# Patient Record
Sex: FEMALE | Race: BLACK OR AFRICAN AMERICAN | NOT HISPANIC OR LATINO | Employment: FULL TIME | URBAN - METROPOLITAN AREA
[De-identification: names, ages, dates, MRNs, and addresses within clinical notes are randomized per-mention and may not be internally consistent; named-entity substitution may affect disease eponyms.]

---

## 2017-03-08 ENCOUNTER — HOSPITAL ENCOUNTER (OUTPATIENT)
Dept: RADIOLOGY | Facility: HOSPITAL | Age: 47
Discharge: HOME | End: 2017-03-08
Attending: NURSE PRACTITIONER
Payer: MEDICAID

## 2017-03-08 DIAGNOSIS — M25.561 RIGHT KNEE PAIN: ICD-10-CM

## 2017-03-08 PROCEDURE — 73564 X-RAY EXAM KNEE 4 OR MORE: CPT | Mod: TC,PO,RT

## 2017-03-10 DIAGNOSIS — E11.9 TYPE 2 DIABETES MELLITUS WITHOUT COMPLICATION: ICD-10-CM

## 2017-04-23 RX ORDER — HYDROCHLOROTHIAZIDE 25 MG/1
TABLET ORAL
Qty: 90 TABLET | Refills: 1 | Status: SHIPPED | OUTPATIENT
Start: 2017-04-23 | End: 2020-10-12 | Stop reason: SDUPTHER

## 2017-05-03 RX ORDER — LISINOPRIL 20 MG/1
TABLET ORAL
Qty: 90 TABLET | Refills: 3 | Status: SHIPPED | OUTPATIENT
Start: 2017-05-03 | End: 2020-07-22 | Stop reason: SDUPTHER

## 2017-06-01 ENCOUNTER — PATIENT OUTREACH (OUTPATIENT)
Dept: ADMINISTRATIVE | Facility: HOSPITAL | Age: 47
End: 2017-06-01

## 2017-06-01 NOTE — PROGRESS NOTES
Phoned patient to confirm PCP and schedule office visit. Unable to reach, both numbers rang a few times then gave a busy signal, no voicemail.

## 2017-07-14 DIAGNOSIS — Z12.31 SCREENING MAMMOGRAM, ENCOUNTER FOR: Primary | ICD-10-CM

## 2017-07-27 ENCOUNTER — HOSPITAL ENCOUNTER (OUTPATIENT)
Dept: RADIOLOGY | Facility: HOSPITAL | Age: 47
Discharge: HOME | End: 2017-07-27
Attending: OBSTETRICS & GYNECOLOGY
Payer: MEDICAID

## 2017-07-27 VITALS — BODY MASS INDEX: 43.49 KG/M2 | HEIGHT: 65 IN | WEIGHT: 261 LBS

## 2017-07-27 DIAGNOSIS — Z12.31 SCREENING MAMMOGRAM, ENCOUNTER FOR: ICD-10-CM

## 2017-07-27 PROCEDURE — 77067 SCR MAMMO BI INCL CAD: CPT | Mod: TC

## 2017-09-25 ENCOUNTER — LAB VISIT (OUTPATIENT)
Dept: LAB | Facility: HOSPITAL | Age: 47
End: 2017-09-25
Attending: FAMILY MEDICINE
Payer: MEDICAID

## 2017-09-25 DIAGNOSIS — E78.2 MIXED HYPERLIPIDEMIA: ICD-10-CM

## 2017-09-25 DIAGNOSIS — E11.9 TYPE 2 DIABETES MELLITUS WITHOUT COMPLICATION: ICD-10-CM

## 2017-09-25 DIAGNOSIS — E55.9 UNSPECIFIED VITAMIN D DEFICIENCY: Primary | ICD-10-CM

## 2017-09-25 LAB
25(OH)D3+25(OH)D2 SERPL-MCNC: 38 NG/ML
ANION GAP SERPL CALC-SCNC: 13 MMOL/L
BUN SERPL-MCNC: 11 MG/DL
CALCIUM SERPL-MCNC: 9.8 MG/DL
CHLORIDE SERPL-SCNC: 101 MMOL/L
CHOLEST SERPL-MCNC: 223 MG/DL
CHOLEST/HDLC SERPL: 3.4 {RATIO}
CO2 SERPL-SCNC: 26 MMOL/L
CREAT SERPL-MCNC: 0.69 MG/DL
EST. GFR  (AFRICAN AMERICAN): >60 ML/MIN/1.73 M^2
EST. GFR  (NON AFRICAN AMERICAN): >60 ML/MIN/1.73 M^2
ESTIMATED AVG GLUCOSE: 123 MG/DL
ESTIMATED AVG GLUCOSE: 123 MG/DL
GLUCOSE SERPL-MCNC: 107 MG/DL
HBA1C MFR BLD HPLC: 5.9 %
HBA1C MFR BLD HPLC: 5.9 %
HDLC SERPL-MCNC: 65 MG/DL
HDLC SERPL: 29.1 %
LDLC SERPL CALC-MCNC: 139.2 MG/DL
NONHDLC SERPL-MCNC: 158 MG/DL
POTASSIUM SERPL-SCNC: 4 MMOL/L
SODIUM SERPL-SCNC: 140 MMOL/L
TRIGL SERPL-MCNC: 94 MG/DL

## 2017-09-25 PROCEDURE — 83036 HEMOGLOBIN GLYCOSYLATED A1C: CPT | Mod: PO

## 2017-09-25 PROCEDURE — 80061 LIPID PANEL: CPT

## 2017-09-25 PROCEDURE — 36415 COLL VENOUS BLD VENIPUNCTURE: CPT | Mod: PO

## 2017-09-25 PROCEDURE — 82306 VITAMIN D 25 HYDROXY: CPT | Mod: PO

## 2017-09-25 PROCEDURE — 80048 BASIC METABOLIC PNL TOTAL CA: CPT | Mod: PO

## 2018-02-28 ENCOUNTER — CLINICAL SUPPORT (OUTPATIENT)
Dept: FAMILY MEDICINE | Facility: CLINIC | Age: 48
End: 2018-02-28
Payer: COMMERCIAL

## 2018-02-28 DIAGNOSIS — Z02.4 ENCOUNTER FOR CDL (COMMERCIAL DRIVING LICENSE) EXAM: Primary | ICD-10-CM

## 2018-02-28 PROCEDURE — 81002 URINALYSIS NONAUTO W/O SCOPE: CPT | Mod: S$GLB,,, | Performed by: FAMILY MEDICINE

## 2018-02-28 PROCEDURE — 99385 PREV VISIT NEW AGE 18-39: CPT | Mod: S$GLB,,, | Performed by: FAMILY MEDICINE

## 2018-03-05 LAB
BILIRUB SERPL-MCNC: ABNORMAL MG/DL
BLOOD URINE, POC: 250
COLOR, POC UA: YELLOW
GLUCOSE UR QL STRIP: NORMAL
KETONES UR QL STRIP: ABNORMAL
LEUKOCYTE ESTERASE URINE, POC: ABNORMAL
NITRITE, POC UA: ABNORMAL
PH, POC UA: ABNORMAL
PROTEIN, POC: NORMAL
SPECIFIC GRAVITY, POC UA: 1.01
UROBILINOGEN, POC UA: NORMAL

## 2018-04-18 ENCOUNTER — LAB VISIT (OUTPATIENT)
Dept: LAB | Facility: HOSPITAL | Age: 48
End: 2018-04-18
Attending: NURSE PRACTITIONER
Payer: COMMERCIAL

## 2018-04-18 DIAGNOSIS — E55.9 AVITAMINOSIS D: Primary | ICD-10-CM

## 2018-04-18 LAB
BASOPHILS # BLD AUTO: 0.02 K/UL
BASOPHILS NFR BLD: 0.2 %
CHOLEST SERPL-MCNC: 220 MG/DL
CHOLEST/HDLC SERPL: 3.6 {RATIO}
DIFFERENTIAL METHOD: NORMAL
EOSINOPHIL # BLD AUTO: 0.5 K/UL
EOSINOPHIL NFR BLD: 5 %
ERYTHROCYTE [DISTWIDTH] IN BLOOD BY AUTOMATED COUNT: 13.6 %
ESTIMATED AVG GLUCOSE: 128 MG/DL
HBA1C MFR BLD HPLC: 6.1 %
HCT VFR BLD AUTO: 42 %
HDLC SERPL-MCNC: 61 MG/DL
HDLC SERPL: 27.7 %
HGB BLD-MCNC: 13.5 G/DL
LDLC SERPL CALC-MCNC: 138.6 MG/DL
LYMPHOCYTES # BLD AUTO: 2.5 K/UL
LYMPHOCYTES NFR BLD: 28.4 %
MCH RBC QN AUTO: 30.8 PG
MCHC RBC AUTO-ENTMCNC: 32.1 G/DL
MCV RBC AUTO: 96 FL
MONOCYTES # BLD AUTO: 0.8 K/UL
MONOCYTES NFR BLD: 8.9 %
NEUTROPHILS # BLD AUTO: 5.1 K/UL
NEUTROPHILS NFR BLD: 57.3 %
NONHDLC SERPL-MCNC: 159 MG/DL
PLATELET # BLD AUTO: 301 K/UL
PMV BLD AUTO: 10.8 FL
RBC # BLD AUTO: 4.38 M/UL
TRIGL SERPL-MCNC: 102 MG/DL
TSH SERPL DL<=0.005 MIU/L-ACNC: 1.31 UIU/ML
WBC # BLD AUTO: 8.92 K/UL

## 2018-04-18 PROCEDURE — 80061 LIPID PANEL: CPT

## 2018-04-18 PROCEDURE — 85025 COMPLETE CBC W/AUTO DIFF WBC: CPT | Mod: PO

## 2018-04-18 PROCEDURE — 84443 ASSAY THYROID STIM HORMONE: CPT | Mod: PO

## 2018-04-18 PROCEDURE — 83036 HEMOGLOBIN GLYCOSYLATED A1C: CPT

## 2018-04-18 PROCEDURE — 36415 COLL VENOUS BLD VENIPUNCTURE: CPT | Mod: PO

## 2019-02-11 DIAGNOSIS — Z12.39 SCREENING BREAST EXAMINATION: Primary | ICD-10-CM

## 2019-02-12 ENCOUNTER — CLINICAL SUPPORT (OUTPATIENT)
Dept: FAMILY MEDICINE | Facility: CLINIC | Age: 49
End: 2019-02-12
Payer: COMMERCIAL

## 2019-02-12 PROCEDURE — 99201 PR OFFICE/OUTPT VISIT,NEW,LEVL I: CPT | Mod: S$GLB,,, | Performed by: FAMILY MEDICINE

## 2019-02-12 PROCEDURE — 99201 PR OFFICE/OUTPT VISIT,NEW,LEVL I: ICD-10-PCS | Mod: S$GLB,,, | Performed by: FAMILY MEDICINE

## 2019-02-12 PROCEDURE — 81002 URINALYSIS NONAUTO W/O SCOPE: CPT | Mod: S$GLB,,, | Performed by: FAMILY MEDICINE

## 2019-02-12 PROCEDURE — 81002 PR URINALYSIS NONAUTO W/O SCOPE: ICD-10-PCS | Mod: S$GLB,,, | Performed by: FAMILY MEDICINE

## 2019-02-12 RX ORDER — AMITRIPTYLINE HYDROCHLORIDE 10 MG/1
TABLET, FILM COATED ORAL
Qty: 60 TABLET | Refills: 1 | Status: SHIPPED | OUTPATIENT
Start: 2019-02-12 | End: 2020-08-12 | Stop reason: SDUPTHER

## 2019-02-15 ENCOUNTER — HOSPITAL ENCOUNTER (OUTPATIENT)
Dept: RADIOLOGY | Facility: HOSPITAL | Age: 49
Discharge: HOME | End: 2019-02-15
Attending: OBSTETRICS & GYNECOLOGY
Payer: COMMERCIAL

## 2019-02-15 DIAGNOSIS — Z12.39 SCREENING BREAST EXAMINATION: ICD-10-CM

## 2019-02-15 PROCEDURE — 77067 SCR MAMMO BI INCL CAD: CPT | Mod: TC,PO

## 2019-11-05 ENCOUNTER — OCCUPATIONAL HEALTH (OUTPATIENT)
Dept: URGENT CARE | Facility: CLINIC | Age: 49
End: 2019-11-05

## 2019-11-05 DIAGNOSIS — Z02.1 ENCOUNTER FOR PRE-EMPLOYMENT HEALTH SCREENING EXAMINATION: Primary | ICD-10-CM

## 2019-11-05 DIAGNOSIS — Z02.83 ENCOUNTER FOR DRUG SCREENING: Primary | ICD-10-CM

## 2019-11-05 PROCEDURE — 99499 PHYSICAL, BASIC COMPLEXITY: ICD-10-PCS | Mod: S$GLB,,, | Performed by: PREVENTIVE MEDICINE

## 2019-11-05 PROCEDURE — 80305 DRUG TEST PRSMV DIR OPT OBS: CPT | Mod: S$GLB,,, | Performed by: PREVENTIVE MEDICINE

## 2019-11-05 PROCEDURE — 99499 UNLISTED E&M SERVICE: CPT | Mod: S$GLB,,, | Performed by: PREVENTIVE MEDICINE

## 2019-11-05 PROCEDURE — 99199 UNLISTED SPECIAL SVC PX/RPRT: CPT | Mod: S$GLB,,, | Performed by: PREVENTIVE MEDICINE

## 2019-11-05 PROCEDURE — 99199 OCC MED MRO FEE: ICD-10-PCS | Mod: S$GLB,,, | Performed by: PREVENTIVE MEDICINE

## 2019-11-05 PROCEDURE — 80305 OOH COLLECTION ONLY DRUG SCREEN: ICD-10-PCS | Mod: S$GLB,,, | Performed by: PREVENTIVE MEDICINE

## 2020-01-20 ENCOUNTER — CLINICAL SUPPORT (OUTPATIENT)
Dept: FAMILY MEDICINE | Facility: CLINIC | Age: 50
End: 2020-01-20

## 2020-01-20 PROCEDURE — 81002 URINALYSIS NONAUTO W/O SCOPE: CPT | Mod: S$GLB,,, | Performed by: FAMILY MEDICINE

## 2020-01-20 PROCEDURE — 81002 PR URINALYSIS NONAUTO W/O SCOPE: ICD-10-PCS | Mod: S$GLB,,, | Performed by: FAMILY MEDICINE

## 2020-01-20 PROCEDURE — 99201 PR OFFICE/OUTPT VISIT,NEW,LEVL I: CPT | Mod: S$GLB,,, | Performed by: FAMILY MEDICINE

## 2020-01-20 PROCEDURE — 99201 PR OFFICE/OUTPT VISIT,NEW,LEVL I: ICD-10-PCS | Mod: S$GLB,,, | Performed by: FAMILY MEDICINE

## 2020-07-22 ENCOUNTER — TELEPHONE (OUTPATIENT)
Dept: FAMILY MEDICINE | Facility: CLINIC | Age: 50
End: 2020-07-22

## 2020-07-22 RX ORDER — METOPROLOL TARTRATE 100 MG/1
100 TABLET ORAL DAILY
Qty: 30 TABLET | Refills: 0 | Status: SHIPPED | OUTPATIENT
Start: 2020-07-22 | End: 2020-08-12 | Stop reason: SDUPTHER

## 2020-07-22 RX ORDER — LISINOPRIL 20 MG/1
20 TABLET ORAL DAILY
Qty: 30 TABLET | Refills: 0 | Status: SHIPPED | OUTPATIENT
Start: 2020-07-22 | End: 2020-08-12 | Stop reason: SDUPTHER

## 2020-07-22 RX ORDER — METFORMIN HYDROCHLORIDE 500 MG/1
500 TABLET ORAL 2 TIMES DAILY WITH MEALS
Qty: 60 TABLET | Refills: 0 | Status: SHIPPED | OUTPATIENT
Start: 2020-07-22 | End: 2020-08-12 | Stop reason: SDUPTHER

## 2020-07-22 NOTE — TELEPHONE ENCOUNTER
LM advising patient to contact office. 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  hasn't prescribed any medication for patient since 2016.     ----- Message from Ole Thorne sent at 7/22/2020 10:01 AM CDT -----  Regarding: Patient Advice  Contact: Amanda Colbert  Pt would like to know if she could have an appt made for next week for a check up Pt would like a call regarding her prescriptions     Pt can be reached at [phone]

## 2020-07-22 NOTE — TELEPHONE ENCOUNTER
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

## 2020-07-22 NOTE — TELEPHONE ENCOUNTER
----- Message from Elizabeth Medina sent at 7/22/2020 10:26 AM CDT -----  Regarding: Call  Pt is returning your call and can be reached at [phone].    Thank you.

## 2020-08-05 ENCOUNTER — PATIENT OUTREACH (OUTPATIENT)
Dept: ADMINISTRATIVE | Facility: HOSPITAL | Age: 50
End: 2020-08-05

## 2020-08-12 ENCOUNTER — PATIENT OUTREACH (OUTPATIENT)
Dept: ADMINISTRATIVE | Facility: HOSPITAL | Age: 50
End: 2020-08-12

## 2020-08-12 ENCOUNTER — OFFICE VISIT (OUTPATIENT)
Dept: FAMILY MEDICINE | Facility: CLINIC | Age: 50
End: 2020-08-12
Payer: COMMERCIAL

## 2020-08-12 VITALS
HEART RATE: 103 BPM | SYSTOLIC BLOOD PRESSURE: 116 MMHG | TEMPERATURE: 98 F | OXYGEN SATURATION: 95 % | DIASTOLIC BLOOD PRESSURE: 74 MMHG | HEIGHT: 65 IN | WEIGHT: 261.25 LBS | BODY MASS INDEX: 43.53 KG/M2

## 2020-08-12 DIAGNOSIS — Z00.00 ROUTINE HEALTH MAINTENANCE: Primary | ICD-10-CM

## 2020-08-12 DIAGNOSIS — R73.9 HYPERGLYCEMIA: ICD-10-CM

## 2020-08-12 DIAGNOSIS — Z23 NEED FOR TETANUS BOOSTER: ICD-10-CM

## 2020-08-12 DIAGNOSIS — Z01.419 ENCOUNTER FOR ANNUAL ROUTINE GYNECOLOGICAL EXAMINATION: ICD-10-CM

## 2020-08-12 PROCEDURE — 99396 PR PREVENTIVE VISIT,EST,40-64: ICD-10-PCS | Mod: S$GLB,,, | Performed by: FAMILY MEDICINE

## 2020-08-12 PROCEDURE — 3074F PR MOST RECENT SYSTOLIC BLOOD PRESSURE < 130 MM HG: ICD-10-PCS | Mod: CPTII,S$GLB,, | Performed by: FAMILY MEDICINE

## 2020-08-12 PROCEDURE — 99396 PREV VISIT EST AGE 40-64: CPT | Mod: S$GLB,,, | Performed by: FAMILY MEDICINE

## 2020-08-12 PROCEDURE — 3078F PR MOST RECENT DIASTOLIC BLOOD PRESSURE < 80 MM HG: ICD-10-PCS | Mod: CPTII,S$GLB,, | Performed by: FAMILY MEDICINE

## 2020-08-12 PROCEDURE — 3078F DIAST BP <80 MM HG: CPT | Mod: CPTII,S$GLB,, | Performed by: FAMILY MEDICINE

## 2020-08-12 PROCEDURE — 3008F PR BODY MASS INDEX (BMI) DOCUMENTED: ICD-10-PCS | Mod: CPTII,S$GLB,, | Performed by: FAMILY MEDICINE

## 2020-08-12 PROCEDURE — 3008F BODY MASS INDEX DOCD: CPT | Mod: CPTII,S$GLB,, | Performed by: FAMILY MEDICINE

## 2020-08-12 PROCEDURE — 3074F SYST BP LT 130 MM HG: CPT | Mod: CPTII,S$GLB,, | Performed by: FAMILY MEDICINE

## 2020-08-12 RX ORDER — MELOXICAM 7.5 MG/1
7.5 TABLET ORAL 2 TIMES DAILY
Qty: 180 TABLET | Refills: 1 | Status: SHIPPED | OUTPATIENT
Start: 2020-08-12 | End: 2021-08-23 | Stop reason: SDUPTHER

## 2020-08-12 RX ORDER — MELOXICAM 7.5 MG/1
7.5 TABLET ORAL 2 TIMES DAILY
COMMUNITY
End: 2020-08-12 | Stop reason: SDUPTHER

## 2020-08-12 RX ORDER — METOPROLOL TARTRATE 100 MG/1
100 TABLET ORAL DAILY
Qty: 90 TABLET | Refills: 3 | Status: SHIPPED | OUTPATIENT
Start: 2020-08-12 | End: 2021-08-23 | Stop reason: SDUPTHER

## 2020-08-12 RX ORDER — LISINOPRIL 20 MG/1
20 TABLET ORAL DAILY
Qty: 90 TABLET | Refills: 3 | Status: SHIPPED | OUTPATIENT
Start: 2020-08-12 | End: 2021-09-17 | Stop reason: SDUPTHER

## 2020-08-12 RX ORDER — METFORMIN HYDROCHLORIDE 500 MG/1
500 TABLET ORAL 2 TIMES DAILY WITH MEALS
Qty: 180 TABLET | Refills: 3 | Status: SHIPPED | OUTPATIENT
Start: 2020-08-12 | End: 2021-08-23 | Stop reason: SDUPTHER

## 2020-08-12 RX ORDER — AMITRIPTYLINE HYDROCHLORIDE 10 MG/1
TABLET, FILM COATED ORAL
Qty: 180 TABLET | Refills: 1 | Status: SHIPPED | OUTPATIENT
Start: 2020-08-12 | End: 2021-08-23

## 2020-08-12 NOTE — LETTER
August 12, 2020      [address]  [place] [address]  Phone: [phone]  Fax: [phone]       Patient: Amanda Colbert   YOB: 1970  Date of Visit: 08/12/2020    To Whom It May Concern:    Sameer Colbert  was at [facility] on 08/12/2020. She may return to work on 08/13/2020 with no restrictions. If you have any questions or concerns, or if I can be of further assistance, please do not hesitate to contact me.    Sincerely,    Roge Gayle MD

## 2020-08-12 NOTE — PROGRESS NOTES
" Patient ID: Amanda Colbert is a 49 y.o. female.    Chief Complaint: Check up; Med refill    HPI      Amanda Colbert is a 49 y.o. female here for follow-up visit-annual visit.  Patient with hypertension uses Lopressor 100 milligrams daily-lisinopril and hydrochlorothiazide  Patient with glucose intolerance uses metformin daily  Insomnia-Elavil 10 milligrams    Vitals:    08/12/20 1508   BP: 116/74   Pulse: 103   Temp: 97.7 °F (36.5 °C)   SpO2: 95%   Weight: 118.5 kg (261 lb 3.9 oz)   Height: 5' 5" (1.651 m)            Review of Symptoms    Constitutional  Neg activity change, No chills /fever   Resp  Neg hemoptysis, stridor, choking  CVS  Neg chest pain, palpitations    Physical Exam    Constitutional:  Oriented to person, place, and time.appears well-developed and well-nourished.  No distress.      HENT  Head: Normocephalic and atraumatic  Right Ear: External ear normal.   Left Ear: External ear normal.   Nose: External nose normal.   Mouth:  Moist mucus membranes.    Eyes:  Conjunctivae are normal. Right eye exhibits no discharge.  Left eye exhibits no discharge. No scleral icterus.  No periorbital edema    Cardiovascular:  Regular rate and rhythm with normal S1 and S2     Pulmonary/Chest:   Clear to auscultation bilaterally without wheezes, rhonchi or rales      Musculoskeletal:  No edema. No obvious deformity No wasting       Neurological:  Alert and oriented to person, place, and time.   Coordination normal.     Skin:   Skin is warm and dry.  No diaphoresis.   No rash noted.     Psychiatric: Normal mood and affect. Behavior is normal.  Judgment and thought content normal.     Complete Blood Count  Lab Results   Component Value Date    RBC 4.38 04/18/2018    HGB 13.5 04/18/2018    HCT 42.0 04/18/2018    MCV 96 04/18/2018    MCH 30.8 04/18/2018    MCHC 32.1 04/18/2018    RDW 13.6 04/18/2018     04/18/2018    MPV 10.8 04/18/2018    GRAN 5.1 04/18/2018    GRAN 57.3 04/18/2018    LYMPH 2.5 04/18/2018    " LYMPH 28.4 04/18/2018    MONO 0.8 04/18/2018    MONO 8.9 04/18/2018    EOS 0.5 04/18/2018    BASO 0.02 04/18/2018    EOSINOPHIL 5.0 04/18/2018    BASOPHIL 0.2 04/18/2018    DIFFMETHOD Automated 04/18/2018       Comprehensive Metabolic Panel  No results found for: GLU, BUN, CREATININE, NA, K, CL, PROT, ALBUMIN, BILITOT, AST, ALKPHOS, CO2, ALT, ANIONGAP, EGFRNONAA, ESTGFRAFRICA    TSH  No results found for: TSH    Assessment / Plan:      ICD-10-CM ICD-9-CM   1. Routine health maintenance  Z00.00 V70.0   2. Need for tetanus booster  Z23 V03.7   3. Encounter for annual routine gynecological examination  Z01.419 V72.31   4. Hyperglycemia  R73.9 790.29     Routine health maintenance  -     Comprehensive metabolic panel; Future; Expected date: 08/12/2020  -     CBC auto differential; Future; Expected date: 08/12/2020  -     Lipid Panel; Future; Expected date: 08/12/2020  -     TSH; Future; Expected date: 08/12/2020  -     Hemoglobin A1C; Future; Expected date: 08/12/2020    Need for tetanus booster  -     (In Office Administered) Tdap Vaccine    Encounter for annual routine gynecological examination  -     Ambulatory referral/consult to Obstetrics / Gynecology; Future; Expected date: 08/19/2020    Hyperglycemia  -     Comprehensive metabolic panel; Future; Expected date: 08/12/2020  -     CBC auto differential; Future; Expected date: 08/12/2020  -     Lipid Panel; Future; Expected date: 08/12/2020  -     TSH; Future; Expected date: 08/12/2020  -     Hemoglobin A1C; Future; Expected date: 08/12/2020    Other orders  -     metoprolol tartrate (LOPRESSOR) 100 MG tablet; Take 1 tablet (100 mg total) by mouth once daily.  Dispense: 90 tablet; Refill: 3  -     metFORMIN (GLUCOPHAGE) 500 MG tablet; Take 1 tablet (500 mg total) by mouth 2 (two) times daily with meals.  Dispense: 180 tablet; Refill: 3  -     meloxicam (MOBIC) 7.5 MG tablet; Take 1 tablet (7.5 mg total) by mouth 2 (two) times a day.  Dispense: 180 tablet; Refill:  1  -     lisinopriL (PRINIVIL,ZESTRIL) 20 MG tablet; Take 1 tablet (20 mg total) by mouth once daily.  Dispense: 90 tablet; Refill: 3  -     amitriptyline (ELAVIL) 10 MG tablet; One or two nightly for insomnia  Dispense: 180 tablet; Refill: 1

## 2020-08-13 PROCEDURE — 90471 TDAP VACCINE GREATER THAN OR EQUAL TO 7YO IM: ICD-10-PCS | Mod: S$GLB,,, | Performed by: FAMILY MEDICINE

## 2020-08-13 PROCEDURE — 90471 IMMUNIZATION ADMIN: CPT | Mod: S$GLB,,, | Performed by: FAMILY MEDICINE

## 2020-08-13 PROCEDURE — 90715 TDAP VACCINE 7 YRS/> IM: CPT | Mod: S$GLB,,, | Performed by: FAMILY MEDICINE

## 2020-08-13 PROCEDURE — 90715 TDAP VACCINE GREATER THAN OR EQUAL TO 7YO IM: ICD-10-PCS | Mod: S$GLB,,, | Performed by: FAMILY MEDICINE

## 2020-09-04 DIAGNOSIS — Z12.11 COLON CANCER SCREENING: ICD-10-CM

## 2020-09-11 ENCOUNTER — PATIENT OUTREACH (OUTPATIENT)
Dept: ADMINISTRATIVE | Facility: HOSPITAL | Age: 50
End: 2020-09-11

## 2020-09-13 ENCOUNTER — PATIENT OUTREACH (OUTPATIENT)
Dept: ADMINISTRATIVE | Facility: OTHER | Age: 50
End: 2020-09-13

## 2020-09-13 ENCOUNTER — TELEPHONE (OUTPATIENT)
Dept: OBSTETRICS AND GYNECOLOGY | Facility: CLINIC | Age: 50
End: 2020-09-13

## 2020-09-13 NOTE — TELEPHONE ENCOUNTER
Call placed to patient to reschedule appt due to mandatory evacuation in [facility]. Son answered the phone and said he would let her know.

## 2020-09-14 ENCOUNTER — TELEPHONE (OUTPATIENT)
Dept: OBSTETRICS AND GYNECOLOGY | Facility: CLINIC | Age: 50
End: 2020-09-14

## 2020-09-14 NOTE — TELEPHONE ENCOUNTER
Called patient, informed patient provider would not be in the office on 9/14/20 due to the weather. I was calling to see if patient would wanted to do change her appointment to a virtual visit or reschedule her appointment.

## 2020-09-18 ENCOUNTER — TELEPHONE (OUTPATIENT)
Dept: FAMILY MEDICINE | Facility: CLINIC | Age: 50
End: 2020-09-18

## 2020-10-12 RX ORDER — HYDROCHLOROTHIAZIDE 25 MG/1
TABLET ORAL
Qty: 30 TABLET | Refills: 0 | Status: SHIPPED | OUTPATIENT
Start: 2020-10-12 | End: 2020-12-04 | Stop reason: SDUPTHER

## 2020-10-12 NOTE — TELEPHONE ENCOUNTER
----- Message from Jacqueline Magallanes sent at 10/12/2020  8:13 AM CDT -----  Contact: [phone]  Type:  Needs Medical Advice    Who Called: PT  Reason for call: regarding a refill on hydrochlorothiazide (HYDRODIURIL) 25 MG tablet  Pharmacy name and phone #:  [facility] [address] [place] [address] [address] [facility]  Would the patient rather a call back or a response via [facility]?  Call back  Best Call Back Number: [phone]

## 2020-10-12 NOTE — TELEPHONE ENCOUNTER
Patient states she was just here in august for her annual and her BP was good. She states she will go get her blood work done. Does she still have to come in? Please advise

## 2020-11-11 ENCOUNTER — LAB VISIT (OUTPATIENT)
Dept: LAB | Facility: HOSPITAL | Age: 50
End: 2020-11-11
Attending: FAMILY MEDICINE
Payer: COMMERCIAL

## 2020-11-11 DIAGNOSIS — R73.9 HYPERGLYCEMIA: ICD-10-CM

## 2020-11-11 DIAGNOSIS — Z00.00 ROUTINE HEALTH MAINTENANCE: ICD-10-CM

## 2020-11-11 LAB
ALBUMIN SERPL BCP-MCNC: 4.1 G/DL (ref 3.5–5.2)
ALP SERPL-CCNC: 71 U/L (ref 38–126)
ALT SERPL W/O P-5'-P-CCNC: 26 U/L (ref 10–44)
ANION GAP SERPL CALC-SCNC: 9 MMOL/L (ref 8–16)
AST SERPL-CCNC: 33 U/L (ref 15–46)
BASOPHILS # BLD AUTO: 0.04 K/UL (ref 0–0.2)
BASOPHILS NFR BLD: 0.4 % (ref 0–1.9)
BILIRUB SERPL-MCNC: 0.5 MG/DL (ref 0.1–1)
BUN SERPL-MCNC: 18 MG/DL (ref 7–17)
CALCIUM SERPL-MCNC: 9.3 MG/DL (ref 8.7–10.5)
CHLORIDE SERPL-SCNC: 101 MMOL/L (ref 95–110)
CHOLEST SERPL-MCNC: 191 MG/DL (ref 120–199)
CHOLEST/HDLC SERPL: 2.9 {RATIO} (ref 2–5)
CO2 SERPL-SCNC: 29 MMOL/L (ref 23–29)
CREAT SERPL-MCNC: 0.85 MG/DL (ref 0.5–1.4)
DIFFERENTIAL METHOD: NORMAL
EOSINOPHIL # BLD AUTO: 0.3 K/UL (ref 0–0.5)
EOSINOPHIL NFR BLD: 3.5 % (ref 0–8)
ERYTHROCYTE [DISTWIDTH] IN BLOOD BY AUTOMATED COUNT: 14 % (ref 11.5–14.5)
EST. GFR  (AFRICAN AMERICAN): >60 ML/MIN/1.73 M^2
EST. GFR  (NON AFRICAN AMERICAN): >60 ML/MIN/1.73 M^2
ESTIMATED AVG GLUCOSE: 128 MG/DL (ref 68–131)
GLUCOSE SERPL-MCNC: 107 MG/DL (ref 70–110)
HBA1C MFR BLD HPLC: 6.1 % (ref 4–5.6)
HCT VFR BLD AUTO: 37.8 % (ref 37–48.5)
HDLC SERPL-MCNC: 66 MG/DL (ref 40–75)
HDLC SERPL: 34.6 % (ref 20–50)
HGB BLD-MCNC: 12.2 G/DL (ref 12–16)
IMM GRANULOCYTES # BLD AUTO: 0.02 K/UL (ref 0–0.04)
IMM GRANULOCYTES NFR BLD AUTO: 0.2 % (ref 0–0.5)
LDLC SERPL CALC-MCNC: 106.4 MG/DL (ref 63–159)
LYMPHOCYTES # BLD AUTO: 3.1 K/UL (ref 1–4.8)
LYMPHOCYTES NFR BLD: 33.7 % (ref 18–48)
MCH RBC QN AUTO: 30.3 PG (ref 27–31)
MCHC RBC AUTO-ENTMCNC: 32.3 G/DL (ref 32–36)
MCV RBC AUTO: 94 FL (ref 82–98)
MONOCYTES # BLD AUTO: 0.6 K/UL (ref 0.3–1)
MONOCYTES NFR BLD: 7 % (ref 4–15)
NEUTROPHILS # BLD AUTO: 5 K/UL (ref 1.8–7.7)
NEUTROPHILS NFR BLD: 55.2 % (ref 38–73)
NONHDLC SERPL-MCNC: 125 MG/DL
NRBC BLD-RTO: 0 /100 WBC
PLATELET # BLD AUTO: 265 K/UL (ref 150–350)
PMV BLD AUTO: 10.3 FL (ref 9.2–12.9)
POTASSIUM SERPL-SCNC: 4 MMOL/L (ref 3.5–5.1)
PROT SERPL-MCNC: 7.6 G/DL (ref 6–8.4)
RBC # BLD AUTO: 4.02 M/UL (ref 4–5.4)
SODIUM SERPL-SCNC: 139 MMOL/L (ref 136–145)
TRIGL SERPL-MCNC: 93 MG/DL (ref 30–150)
TSH SERPL DL<=0.005 MIU/L-ACNC: 1.25 UIU/ML (ref 0.4–4)
WBC # BLD AUTO: 9.09 K/UL (ref 3.9–12.7)

## 2020-11-11 PROCEDURE — 84443 ASSAY THYROID STIM HORMONE: CPT | Mod: PO

## 2020-11-11 PROCEDURE — 80053 COMPREHEN METABOLIC PANEL: CPT | Mod: PO

## 2020-11-11 PROCEDURE — 83036 HEMOGLOBIN GLYCOSYLATED A1C: CPT

## 2020-11-11 PROCEDURE — 80061 LIPID PANEL: CPT

## 2020-11-11 PROCEDURE — 36415 COLL VENOUS BLD VENIPUNCTURE: CPT | Mod: PO

## 2020-11-11 PROCEDURE — 85025 COMPLETE CBC W/AUTO DIFF WBC: CPT | Mod: PO

## 2020-12-04 ENCOUNTER — TELEPHONE (OUTPATIENT)
Dept: FAMILY MEDICINE | Facility: CLINIC | Age: 50
End: 2020-12-04

## 2020-12-04 RX ORDER — HYDROCHLOROTHIAZIDE 25 MG/1
TABLET ORAL
Qty: 30 TABLET | Refills: 3 | Status: SHIPPED | OUTPATIENT
Start: 2020-12-04 | End: 2021-08-23 | Stop reason: SDUPTHER

## 2020-12-04 NOTE — TELEPHONE ENCOUNTER
----- Message from Jacqueline Magallanes sent at 12/4/2020 11:29 AM CST -----  Contact: [phone]  Type:  RX Refill Request    Who Called:  PT  Refill or New Rx: Refill  RX Name and Strength:hydroCHLOROthiazide (HYDRODIURIL) 25 MG tablet  How is the patient currently taking it? (ex. 1XDay): half tablet daily  Is this a 30 day or 90 day RX: 30 day  Preferred Pharmacy with phone number:[facility] [address] [place] [address] [facility]  Local or Mail Order: Local  Ordering Provider: Dr Alvarez  Would the patient rather a call back or a response via [facility]?  [facility] back  Best Call Back Number:[phone]

## 2021-01-13 DIAGNOSIS — Z12.31 OTHER SCREENING MAMMOGRAM: ICD-10-CM

## 2021-01-20 ENCOUNTER — CLINICAL SUPPORT (OUTPATIENT)
Dept: FAMILY MEDICINE | Facility: CLINIC | Age: 51
End: 2021-01-20

## 2021-01-20 ENCOUNTER — IMMUNIZATION (OUTPATIENT)
Dept: FAMILY MEDICINE | Facility: CLINIC | Age: 51
End: 2021-01-20
Payer: COMMERCIAL

## 2021-01-20 VITALS — DIASTOLIC BLOOD PRESSURE: 82 MMHG | SYSTOLIC BLOOD PRESSURE: 126 MMHG

## 2021-01-20 DIAGNOSIS — Z00.00 ROUTINE GENERAL MEDICAL EXAMINATION AT A HEALTH CARE FACILITY: Primary | ICD-10-CM

## 2021-01-20 PROCEDURE — 90471 FLU VACCINE (QUAD) GREATER THAN OR EQUAL TO 3YO PRESERVATIVE FREE IM: ICD-10-PCS | Mod: S$GLB,,, | Performed by: FAMILY MEDICINE

## 2021-01-20 PROCEDURE — 90471 IMMUNIZATION ADMIN: CPT | Mod: S$GLB,,, | Performed by: FAMILY MEDICINE

## 2021-01-20 PROCEDURE — 99202 PR OFFICE/OUTPT VISIT, NEW, LEVL II, 15-29 MIN: ICD-10-PCS | Mod: S$GLB,,, | Performed by: FAMILY MEDICINE

## 2021-01-20 PROCEDURE — 99202 OFFICE O/P NEW SF 15 MIN: CPT | Mod: S$GLB,,, | Performed by: FAMILY MEDICINE

## 2021-01-20 PROCEDURE — 90686 IIV4 VACC NO PRSV 0.5 ML IM: CPT | Mod: S$GLB,,, | Performed by: FAMILY MEDICINE

## 2021-01-20 PROCEDURE — 81002 URINALYSIS NONAUTO W/O SCOPE: CPT | Mod: S$GLB,,, | Performed by: FAMILY MEDICINE

## 2021-01-20 PROCEDURE — 81002 PR URINALYSIS NONAUTO W/O SCOPE: ICD-10-PCS | Mod: S$GLB,,, | Performed by: FAMILY MEDICINE

## 2021-01-20 PROCEDURE — 90686 FLU VACCINE (QUAD) GREATER THAN OR EQUAL TO 3YO PRESERVATIVE FREE IM: ICD-10-PCS | Mod: S$GLB,,, | Performed by: FAMILY MEDICINE

## 2021-02-11 ENCOUNTER — HOSPITAL ENCOUNTER (OUTPATIENT)
Dept: RADIOLOGY | Facility: HOSPITAL | Age: 51
Discharge: HOME | End: 2021-02-11
Attending: FAMILY MEDICINE
Payer: COMMERCIAL

## 2021-02-11 DIAGNOSIS — Z12.31 OTHER SCREENING MAMMOGRAM: ICD-10-CM

## 2021-02-11 PROCEDURE — 77067 SCR MAMMO BI INCL CAD: CPT | Mod: TC,PO

## 2021-03-12 ENCOUNTER — IMMUNIZATION (OUTPATIENT)
Dept: PRIMARY CARE CLINIC | Facility: CLINIC | Age: 51
End: 2021-03-12
Payer: COMMERCIAL

## 2021-03-12 DIAGNOSIS — Z23 NEED FOR VACCINATION: Primary | ICD-10-CM

## 2021-03-12 PROCEDURE — 91300 PR SARS-COV- 2 COVID-19 VACCINE, NO PRSV, 30MCG/0.3ML, IM: ICD-10-PCS | Mod: S$GLB,,, | Performed by: INTERNAL MEDICINE

## 2021-03-12 PROCEDURE — 0001A PR IMMUNIZ ADMIN, SARS-COV-2 COVID-19 VACC, 30MCG/0.3ML, 1ST DOSE: ICD-10-PCS | Mod: CV19,S$GLB,, | Performed by: INTERNAL MEDICINE

## 2021-03-12 PROCEDURE — 0001A PR IMMUNIZ ADMIN, SARS-COV-2 COVID-19 VACC, 30MCG/0.3ML, 1ST DOSE: CPT | Mod: CV19,S$GLB,, | Performed by: INTERNAL MEDICINE

## 2021-03-12 PROCEDURE — 91300 PR SARS-COV- 2 COVID-19 VACCINE, NO PRSV, 30MCG/0.3ML, IM: CPT | Mod: S$GLB,,, | Performed by: INTERNAL MEDICINE

## 2021-03-12 RX ADMIN — Medication 0.3 ML: at 01:03

## 2021-04-02 ENCOUNTER — IMMUNIZATION (OUTPATIENT)
Dept: PRIMARY CARE CLINIC | Facility: CLINIC | Age: 51
End: 2021-04-02
Payer: COMMERCIAL

## 2021-04-02 DIAGNOSIS — Z23 NEED FOR VACCINATION: Primary | ICD-10-CM

## 2021-04-02 PROCEDURE — 91300 PR SARS-COV- 2 COVID-19 VACCINE, NO PRSV, 30MCG/0.3ML, IM: CPT | Mod: S$GLB,,, | Performed by: INTERNAL MEDICINE

## 2021-04-02 PROCEDURE — 91300 PR SARS-COV- 2 COVID-19 VACCINE, NO PRSV, 30MCG/0.3ML, IM: ICD-10-PCS | Mod: S$GLB,,, | Performed by: INTERNAL MEDICINE

## 2021-04-02 PROCEDURE — 0002A PR IMMUNIZ ADMIN, SARS-COV-2 COVID-19 VACC, 30MCG/0.3ML, 2ND DOSE: CPT | Mod: CV19,S$GLB,, | Performed by: INTERNAL MEDICINE

## 2021-04-02 PROCEDURE — 0002A PR IMMUNIZ ADMIN, SARS-COV-2 COVID-19 VACC, 30MCG/0.3ML, 2ND DOSE: ICD-10-PCS | Mod: CV19,S$GLB,, | Performed by: INTERNAL MEDICINE

## 2021-04-02 RX ADMIN — Medication 0.3 ML: at 04:04

## 2021-08-23 ENCOUNTER — OFFICE VISIT (OUTPATIENT)
Dept: FAMILY MEDICINE | Facility: CLINIC | Age: 51
End: 2021-08-23
Payer: COMMERCIAL

## 2021-08-23 VITALS
HEART RATE: 89 BPM | TEMPERATURE: 98 F | DIASTOLIC BLOOD PRESSURE: 80 MMHG | OXYGEN SATURATION: 97 % | SYSTOLIC BLOOD PRESSURE: 146 MMHG | HEIGHT: 65 IN | BODY MASS INDEX: 43.76 KG/M2 | WEIGHT: 262.69 LBS

## 2021-08-23 DIAGNOSIS — Z23 NEED FOR SHINGLES VACCINE: Primary | ICD-10-CM

## 2021-08-23 DIAGNOSIS — R73.9 HYPERGLYCEMIA: ICD-10-CM

## 2021-08-23 DIAGNOSIS — Z00.00 ROUTINE GENERAL MEDICAL EXAMINATION AT A HEALTH CARE FACILITY: ICD-10-CM

## 2021-08-23 PROCEDURE — 1159F MED LIST DOCD IN RCRD: CPT | Mod: CPTII,S$GLB,, | Performed by: FAMILY MEDICINE

## 2021-08-23 PROCEDURE — 3077F PR MOST RECENT SYSTOLIC BLOOD PRESSURE >= 140 MM HG: ICD-10-PCS | Mod: CPTII,S$GLB,, | Performed by: FAMILY MEDICINE

## 2021-08-23 PROCEDURE — 90471 ZOSTER RECOMBINANT VACCINE: ICD-10-PCS | Mod: S$GLB,,, | Performed by: FAMILY MEDICINE

## 2021-08-23 PROCEDURE — 90750 HZV VACC RECOMBINANT IM: CPT | Mod: S$GLB,,, | Performed by: FAMILY MEDICINE

## 2021-08-23 PROCEDURE — 90471 IMMUNIZATION ADMIN: CPT | Mod: S$GLB,,, | Performed by: FAMILY MEDICINE

## 2021-08-23 PROCEDURE — 90750 ZOSTER RECOMBINANT VACCINE: ICD-10-PCS | Mod: S$GLB,,, | Performed by: FAMILY MEDICINE

## 2021-08-23 PROCEDURE — 3077F SYST BP >= 140 MM HG: CPT | Mod: CPTII,S$GLB,, | Performed by: FAMILY MEDICINE

## 2021-08-23 PROCEDURE — 3079F DIAST BP 80-89 MM HG: CPT | Mod: CPTII,S$GLB,, | Performed by: FAMILY MEDICINE

## 2021-08-23 PROCEDURE — 3008F BODY MASS INDEX DOCD: CPT | Mod: CPTII,S$GLB,, | Performed by: FAMILY MEDICINE

## 2021-08-23 PROCEDURE — 99213 PR OFFICE/OUTPT VISIT, EST, LEVL III, 20-29 MIN: ICD-10-PCS | Mod: 25,S$GLB,, | Performed by: FAMILY MEDICINE

## 2021-08-23 PROCEDURE — 1159F PR MEDICATION LIST DOCUMENTED IN MEDICAL RECORD: ICD-10-PCS | Mod: CPTII,S$GLB,, | Performed by: FAMILY MEDICINE

## 2021-08-23 PROCEDURE — 3008F PR BODY MASS INDEX (BMI) DOCUMENTED: ICD-10-PCS | Mod: CPTII,S$GLB,, | Performed by: FAMILY MEDICINE

## 2021-08-23 PROCEDURE — 99213 OFFICE O/P EST LOW 20 MIN: CPT | Mod: 25,S$GLB,, | Performed by: FAMILY MEDICINE

## 2021-08-23 PROCEDURE — 3079F PR MOST RECENT DIASTOLIC BLOOD PRESSURE 80-89 MM HG: ICD-10-PCS | Mod: CPTII,S$GLB,, | Performed by: FAMILY MEDICINE

## 2021-08-23 RX ORDER — HYDROCHLOROTHIAZIDE 25 MG/1
TABLET ORAL
Qty: 90 TABLET | Refills: 3 | Status: SHIPPED | OUTPATIENT
Start: 2021-08-23 | End: 2022-09-30 | Stop reason: SDUPTHER

## 2021-08-23 RX ORDER — METFORMIN HYDROCHLORIDE 500 MG/1
500 TABLET ORAL 2 TIMES DAILY WITH MEALS
Qty: 180 TABLET | Refills: 3 | Status: SHIPPED | OUTPATIENT
Start: 2021-08-23 | End: 2022-09-30 | Stop reason: SDUPTHER

## 2021-08-23 RX ORDER — AMITRIPTYLINE HYDROCHLORIDE 10 MG/1
TABLET, FILM COATED ORAL
Qty: 180 TABLET | Refills: 1 | Status: SHIPPED | OUTPATIENT
Start: 2021-08-23

## 2021-08-23 RX ORDER — MELOXICAM 7.5 MG/1
7.5 TABLET ORAL DAILY
Qty: 90 TABLET | Refills: 1 | Status: SHIPPED | OUTPATIENT
Start: 2021-08-23 | End: 2023-02-01 | Stop reason: SDUPTHER

## 2021-08-23 RX ORDER — METOPROLOL TARTRATE 100 MG/1
100 TABLET ORAL DAILY
Qty: 90 TABLET | Refills: 3 | Status: SHIPPED | OUTPATIENT
Start: 2021-08-23 | End: 2022-09-30 | Stop reason: SDUPTHER

## 2021-08-25 ENCOUNTER — PATIENT OUTREACH (OUTPATIENT)
Dept: ADMINISTRATIVE | Facility: HOSPITAL | Age: 51
End: 2021-08-25

## 2021-08-27 ENCOUNTER — PATIENT OUTREACH (OUTPATIENT)
Dept: ADMINISTRATIVE | Facility: HOSPITAL | Age: 51
End: 2021-08-27

## 2021-09-17 ENCOUNTER — TELEPHONE (OUTPATIENT)
Dept: FAMILY MEDICINE | Facility: CLINIC | Age: 51
End: 2021-09-17

## 2021-09-17 RX ORDER — CIPROFLOXACIN 250 MG/1
250 TABLET, FILM COATED ORAL 2 TIMES DAILY
Qty: 10 TABLET | Refills: 0 | Status: SHIPPED | OUTPATIENT
Start: 2021-09-17 | End: 2022-02-23

## 2021-09-17 RX ORDER — LISINOPRIL 20 MG/1
20 TABLET ORAL DAILY
Qty: 90 TABLET | Refills: 3 | Status: SHIPPED | OUTPATIENT
Start: 2021-09-17 | End: 2022-09-30 | Stop reason: SDUPTHER

## 2021-11-23 ENCOUNTER — TELEPHONE (OUTPATIENT)
Dept: FAMILY MEDICINE | Facility: CLINIC | Age: 51
End: 2021-11-23
Payer: COMMERCIAL

## 2021-11-23 ENCOUNTER — LAB VISIT (OUTPATIENT)
Dept: LAB | Facility: HOSPITAL | Age: 51
End: 2021-11-23
Attending: FAMILY MEDICINE
Payer: COMMERCIAL

## 2021-11-23 DIAGNOSIS — Z00.00 ROUTINE GENERAL MEDICAL EXAMINATION AT A HEALTH CARE FACILITY: ICD-10-CM

## 2021-11-23 DIAGNOSIS — R73.9 HYPERGLYCEMIA: ICD-10-CM

## 2021-11-23 LAB
ALBUMIN SERPL BCP-MCNC: 4.6 G/DL (ref 3.5–5.2)
ALP SERPL-CCNC: 84 U/L (ref 38–126)
ALT SERPL W/O P-5'-P-CCNC: 31 U/L (ref 10–44)
ANION GAP SERPL CALC-SCNC: 9 MMOL/L (ref 8–16)
AST SERPL-CCNC: 32 U/L (ref 15–46)
BASOPHILS # BLD AUTO: 0.04 K/UL (ref 0–0.2)
BASOPHILS NFR BLD: 0.4 % (ref 0–1.9)
BILIRUB SERPL-MCNC: 0.6 MG/DL (ref 0.1–1)
CALCIUM SERPL-MCNC: 9.5 MG/DL (ref 8.7–10.5)
CHLORIDE SERPL-SCNC: 99 MMOL/L (ref 95–110)
CHOLEST SERPL-MCNC: 225 MG/DL (ref 120–199)
CHOLEST/HDLC SERPL: 3.2 {RATIO} (ref 2–5)
CO2 SERPL-SCNC: 31 MMOL/L (ref 23–29)
CREAT SERPL-MCNC: 0.99 MG/DL (ref 0.5–1.4)
DIFFERENTIAL METHOD: ABNORMAL
EOSINOPHIL # BLD AUTO: 0.3 K/UL (ref 0–0.5)
EOSINOPHIL NFR BLD: 2.6 % (ref 0–8)
ERYTHROCYTE [DISTWIDTH] IN BLOOD BY AUTOMATED COUNT: 13.4 % (ref 11.5–14.5)
EST. GFR  (AFRICAN AMERICAN): >60 ML/MIN/1.73 M^2
EST. GFR  (NON AFRICAN AMERICAN): >60 ML/MIN/1.73 M^2
GLUCOSE SERPL-MCNC: 93 MG/DL (ref 70–110)
HCT VFR BLD AUTO: 38.1 % (ref 37–48.5)
HDLC SERPL-MCNC: 71 MG/DL (ref 40–75)
HDLC SERPL: 31.6 % (ref 20–50)
HGB BLD-MCNC: 12.8 G/DL (ref 12–16)
IMM GRANULOCYTES # BLD AUTO: 0.02 K/UL (ref 0–0.04)
IMM GRANULOCYTES NFR BLD AUTO: 0.2 % (ref 0–0.5)
LDLC SERPL CALC-MCNC: 121.2 MG/DL (ref 63–159)
LYMPHOCYTES # BLD AUTO: 2.9 K/UL (ref 1–4.8)
LYMPHOCYTES NFR BLD: 26.4 % (ref 18–48)
MCH RBC QN AUTO: 31.8 PG (ref 27–31)
MCHC RBC AUTO-ENTMCNC: 33.6 G/DL (ref 32–36)
MCV RBC AUTO: 95 FL (ref 82–98)
MONOCYTES # BLD AUTO: 0.8 K/UL (ref 0.3–1)
MONOCYTES NFR BLD: 6.9 % (ref 4–15)
NEUTROPHILS # BLD AUTO: 7.1 K/UL (ref 1.8–7.7)
NEUTROPHILS NFR BLD: 63.5 % (ref 38–73)
NONHDLC SERPL-MCNC: 154 MG/DL
NRBC BLD-RTO: 0 /100 WBC
PLATELET # BLD AUTO: 295 K/UL (ref 150–450)
PMV BLD AUTO: 10.1 FL (ref 9.2–12.9)
POTASSIUM SERPL-SCNC: 4.2 MMOL/L (ref 3.5–5.1)
PROT SERPL-MCNC: 8 G/DL (ref 6–8.4)
RBC # BLD AUTO: 4.02 M/UL (ref 4–5.4)
SODIUM SERPL-SCNC: 139 MMOL/L (ref 136–145)
TRIGL SERPL-MCNC: 164 MG/DL (ref 30–150)
TSH SERPL DL<=0.005 MIU/L-ACNC: 1.47 UIU/ML (ref 0.4–4)
UUN UR-MCNC: 19 MG/DL (ref 7–17)
WBC # BLD AUTO: 11.11 K/UL (ref 3.9–12.7)

## 2021-11-23 PROCEDURE — 80053 COMPREHEN METABOLIC PANEL: CPT | Mod: PO | Performed by: FAMILY MEDICINE

## 2021-11-23 PROCEDURE — 80061 LIPID PANEL: CPT | Performed by: FAMILY MEDICINE

## 2021-11-23 PROCEDURE — 85025 COMPLETE CBC W/AUTO DIFF WBC: CPT | Mod: PO | Performed by: FAMILY MEDICINE

## 2021-11-23 PROCEDURE — 36415 COLL VENOUS BLD VENIPUNCTURE: CPT | Mod: PO | Performed by: FAMILY MEDICINE

## 2021-11-23 PROCEDURE — 84443 ASSAY THYROID STIM HORMONE: CPT | Mod: PO | Performed by: FAMILY MEDICINE

## 2022-02-04 ENCOUNTER — CLINICAL SUPPORT (OUTPATIENT)
Dept: FAMILY MEDICINE | Facility: CLINIC | Age: 52
End: 2022-02-04

## 2022-02-04 VITALS
TEMPERATURE: 98 F | HEART RATE: 86 BPM | BODY MASS INDEX: 40.02 KG/M2 | SYSTOLIC BLOOD PRESSURE: 132 MMHG | WEIGHT: 249 LBS | DIASTOLIC BLOOD PRESSURE: 80 MMHG | OXYGEN SATURATION: 98 % | HEIGHT: 66 IN

## 2022-02-04 DIAGNOSIS — Z00.00 ROUTINE GENERAL MEDICAL EXAMINATION AT A HEALTH CARE FACILITY: Primary | ICD-10-CM

## 2022-02-04 PROCEDURE — 99202 PR OFFICE/OUTPT VISIT, NEW, LEVL II, 15-29 MIN: ICD-10-PCS | Mod: S$GLB,,, | Performed by: FAMILY MEDICINE

## 2022-02-04 PROCEDURE — 81002 PR URINALYSIS NONAUTO W/O SCOPE: ICD-10-PCS | Mod: S$GLB,,, | Performed by: FAMILY MEDICINE

## 2022-02-04 PROCEDURE — 99202 OFFICE O/P NEW SF 15 MIN: CPT | Mod: S$GLB,,, | Performed by: FAMILY MEDICINE

## 2022-02-04 PROCEDURE — 81002 URINALYSIS NONAUTO W/O SCOPE: CPT | Mod: S$GLB,,, | Performed by: FAMILY MEDICINE

## 2022-02-16 DIAGNOSIS — Z12.31 OTHER SCREENING MAMMOGRAM: ICD-10-CM

## 2022-02-21 ENCOUNTER — PATIENT MESSAGE (OUTPATIENT)
Dept: ADMINISTRATIVE | Facility: HOSPITAL | Age: 52
End: 2022-02-21
Payer: COMMERCIAL

## 2022-02-23 ENCOUNTER — HOSPITAL ENCOUNTER (OUTPATIENT)
Dept: RADIOLOGY | Facility: HOSPITAL | Age: 52
Discharge: HOME | End: 2022-02-23
Attending: FAMILY MEDICINE
Payer: COMMERCIAL

## 2022-02-23 ENCOUNTER — OFFICE VISIT (OUTPATIENT)
Dept: FAMILY MEDICINE | Facility: CLINIC | Age: 52
End: 2022-02-23
Payer: COMMERCIAL

## 2022-02-23 VITALS
OXYGEN SATURATION: 98 % | BODY MASS INDEX: 40.78 KG/M2 | WEIGHT: 253.75 LBS | HEIGHT: 66 IN | DIASTOLIC BLOOD PRESSURE: 64 MMHG | SYSTOLIC BLOOD PRESSURE: 112 MMHG | TEMPERATURE: 99 F | HEART RATE: 79 BPM

## 2022-02-23 DIAGNOSIS — R73.9 HYPERGLYCEMIA: ICD-10-CM

## 2022-02-23 DIAGNOSIS — Z00.00 ROUTINE HEALTH MAINTENANCE: Primary | ICD-10-CM

## 2022-02-23 DIAGNOSIS — Z12.31 OTHER SCREENING MAMMOGRAM: ICD-10-CM

## 2022-02-23 PROCEDURE — 3074F PR MOST RECENT SYSTOLIC BLOOD PRESSURE < 130 MM HG: ICD-10-PCS | Mod: CPTII,S$GLB,, | Performed by: FAMILY MEDICINE

## 2022-02-23 PROCEDURE — 77067 SCR MAMMO BI INCL CAD: CPT | Mod: TC,PO

## 2022-02-23 PROCEDURE — 99396 PR PREVENTIVE VISIT,EST,40-64: ICD-10-PCS | Mod: S$GLB,,, | Performed by: FAMILY MEDICINE

## 2022-02-23 PROCEDURE — 3074F SYST BP LT 130 MM HG: CPT | Mod: CPTII,S$GLB,, | Performed by: FAMILY MEDICINE

## 2022-02-23 PROCEDURE — 3078F PR MOST RECENT DIASTOLIC BLOOD PRESSURE < 80 MM HG: ICD-10-PCS | Mod: CPTII,S$GLB,, | Performed by: FAMILY MEDICINE

## 2022-02-23 PROCEDURE — 1159F PR MEDICATION LIST DOCUMENTED IN MEDICAL RECORD: ICD-10-PCS | Mod: CPTII,S$GLB,, | Performed by: FAMILY MEDICINE

## 2022-02-23 PROCEDURE — 1159F MED LIST DOCD IN RCRD: CPT | Mod: CPTII,S$GLB,, | Performed by: FAMILY MEDICINE

## 2022-02-23 PROCEDURE — 99396 PREV VISIT EST AGE 40-64: CPT | Mod: S$GLB,,, | Performed by: FAMILY MEDICINE

## 2022-02-23 PROCEDURE — 4010F PR ACE/ARB THEARPY RXD/TAKEN: ICD-10-PCS | Mod: CPTII,S$GLB,, | Performed by: FAMILY MEDICINE

## 2022-02-23 PROCEDURE — 3078F DIAST BP <80 MM HG: CPT | Mod: CPTII,S$GLB,, | Performed by: FAMILY MEDICINE

## 2022-02-23 PROCEDURE — 3008F PR BODY MASS INDEX (BMI) DOCUMENTED: ICD-10-PCS | Mod: CPTII,S$GLB,, | Performed by: FAMILY MEDICINE

## 2022-02-23 PROCEDURE — 3008F BODY MASS INDEX DOCD: CPT | Mod: CPTII,S$GLB,, | Performed by: FAMILY MEDICINE

## 2022-02-23 PROCEDURE — 4010F ACE/ARB THERAPY RXD/TAKEN: CPT | Mod: CPTII,S$GLB,, | Performed by: FAMILY MEDICINE

## 2022-02-23 PROCEDURE — 77063 BREAST TOMOSYNTHESIS BI: CPT | Mod: TC,PO

## 2022-02-23 NOTE — LETTER
February 23, 2022      [place] [id number] [address] [address]  Phone: [phone]  Fax: [phone]       Patient: Amanda Colbert   YOB: 1970  Date of Visit: 02/23/2022    To Whom It May Concern:    Sameer Colbert  was at [facility] on 02/23/2022. 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 If you have any questions or concerns, or if I can be of further assistance, please do not hesitate to contact me.    Sincerely,        Roge Gayle MD

## 2022-02-23 NOTE — PROGRESS NOTES
"   Patient ID: Amanda Colbert is a 51 y.o. female.    Chief Complaint: Follow-up (6 month)    HPI      Amanda Colbert is a 51 y.o. female here for six-month follow-up.  Follow-up for hyperglycemia hypertension.  Stable medical problems at this time.  No physical complaints.    Vitals:    02/23/22 1352   BP: 112/64   BP Location: Left arm   Patient Position: Sitting   Pulse: 79   Temp: 98.6 °F (37 °C)   TempSrc: Oral   SpO2: 98%   Weight: 115.1 kg (253 lb 12 oz)   Height: 5' 6" (1.676 m)            Review of Symptoms      Physical Exam    Constitutional:  Oriented to person, place, and time.appears well-developed and well-nourished.  No distress.      HENT  Head: Normocephalic and atraumatic  Right Ear: External ear normal.   Left Ear: External ear normal.   Nose: External nose normal.   Mouth:  Moist mucus membranes.    Eyes:  Conjunctivae are normal. Right eye exhibits no discharge.  Left eye exhibits no discharge. No scleral icterus.  No periorbital edema    Cardiovascular:  Regular rate and rhythm with normal S1 and S2     Pulmonary/Chest:   Clear to auscultation bilaterally without wheezes, rhonchi or rales      Musculoskeletal:  No edema. No obvious deformity No wasting       Neurological:  Alert and oriented to person, place, and time.   Coordination normal.     Skin:   Skin is warm and dry.  No diaphoresis.   No rash noted.     Psychiatric: Normal mood and affect. Behavior is normal.  Judgment and thought content normal.     Complete Blood Count  Lab Results   Component Value Date    RBC 4.02 11/23/2021    HGB 12.8 11/23/2021    HCT 38.1 11/23/2021    MCV 95 11/23/2021    MCH 31.8 (H) 11/23/2021    MCHC 33.6 11/23/2021    RDW 13.4 11/23/2021     11/23/2021    MPV 10.1 11/23/2021    GRAN 7.1 11/23/2021    GRAN 63.5 11/23/2021    LYMPH 2.9 11/23/2021    LYMPH 26.4 11/23/2021    MONO 0.8 11/23/2021    MONO 6.9 11/23/2021    EOS 0.3 11/23/2021    BASO 0.04 11/23/2021    EOSINOPHIL 2.6 11/23/2021    " BASOPHIL 0.4 11/23/2021    DIFFMETHOD Automated 11/23/2021       Comprehensive Metabolic Panel  Lab Results   Component Value Date    GLU 93 11/23/2021    BUN 19 (H) 11/23/2021    CREATININE 0.99 11/23/2021     11/23/2021    K 4.2 11/23/2021    CL 99 11/23/2021    PROT 8.0 11/23/2021    ALBUMIN 4.6 11/23/2021    BILITOT 0.6 11/23/2021    AST 32 11/23/2021    ALKPHOS 84 11/23/2021    CO2 31 (H) 11/23/2021    ALT 31 11/23/2021    ANIONGAP 9 11/23/2021    EGFRNONAA >60.0 11/23/2021    ESTGFRAFRICA >60.0 11/23/2021       TSH  Lab Results   Component Value Date    TSH 1.470 11/23/2021       Assessment / Plan:      ICD-10-CM ICD-9-CM   1. Routine health maintenance  Z00.00 V70.0   2. Hyperglycemia  R73.9 790.29     Routine health maintenance  -     Cancel: Comprehensive Metabolic Panel; Future  -     Cancel: CBC Auto Differential; Future  -     Cancel: Lipid Panel; Future  -     Cancel: TSH; Future  -     Cancel: Hemoglobin A1C; Future  -     Hemoglobin A1C; Future  -     TSH; Future  -     Lipid Panel; Future  -     CBC Auto Differential; Future  -     Comprehensive Metabolic Panel; Future    Hyperglycemia  -     Cancel: Comprehensive Metabolic Panel; Future  -     Cancel: CBC Auto Differential; Future  -     Cancel: Lipid Panel; Future  -     Cancel: TSH; Future  -     Cancel: Hemoglobin A1C; Future  -     Hemoglobin A1C; Future  -     TSH; Future  -     Lipid Panel; Future  -     CBC Auto Differential; Future  -     Comprehensive Metabolic Panel; Future

## 2022-06-09 ENCOUNTER — OCCUPATIONAL HEALTH (OUTPATIENT)
Dept: URGENT CARE | Facility: CLINIC | Age: 52
End: 2022-06-09

## 2022-06-09 DIAGNOSIS — Z13.9 ENCOUNTER FOR SCREENING: Primary | ICD-10-CM

## 2022-06-09 LAB — BREATH ALCOHOL: NORMAL

## 2022-06-09 PROCEDURE — 99199 UNLISTED SPECIAL SVC PX/RPRT: CPT | Mod: S$GLB,,,

## 2022-06-09 PROCEDURE — 80305 OOH COLLECTION ONLY DRUG SCREEN: ICD-10-PCS | Mod: S$GLB,,,

## 2022-06-09 PROCEDURE — 82075 POCT BREATH ALCOHOL TEST: ICD-10-PCS | Mod: S$GLB,,,

## 2022-06-09 PROCEDURE — 99199 OCC MED MRO FEE: ICD-10-PCS | Mod: S$GLB,,,

## 2022-06-09 PROCEDURE — 82075 ASSAY OF BREATH ETHANOL: CPT | Mod: S$GLB,,,

## 2022-06-09 PROCEDURE — 80305 DRUG TEST PRSMV DIR OPT OBS: CPT | Mod: S$GLB,,,

## 2022-09-30 RX ORDER — METFORMIN HYDROCHLORIDE 500 MG/1
500 TABLET ORAL 2 TIMES DAILY WITH MEALS
Qty: 180 TABLET | Refills: 3 | Status: SHIPPED | OUTPATIENT
Start: 2022-09-30 | End: 2023-10-24 | Stop reason: SDUPTHER

## 2022-09-30 RX ORDER — HYDROCHLOROTHIAZIDE 25 MG/1
TABLET ORAL
Qty: 90 TABLET | Refills: 3 | Status: SHIPPED | OUTPATIENT
Start: 2022-09-30 | End: 2023-10-24

## 2022-09-30 RX ORDER — LISINOPRIL 20 MG/1
20 TABLET ORAL DAILY
Qty: 90 TABLET | Refills: 3 | Status: SHIPPED | OUTPATIENT
Start: 2022-09-30 | End: 2023-09-24 | Stop reason: SDUPTHER

## 2022-09-30 RX ORDER — METOPROLOL TARTRATE 100 MG/1
100 TABLET ORAL DAILY
Qty: 90 TABLET | Refills: 3 | Status: SHIPPED | OUTPATIENT
Start: 2022-09-30 | End: 2023-10-23

## 2022-09-30 NOTE — TELEPHONE ENCOUNTER
Care Due:                  Date            Visit Type   Department     Provider  --------------------------------------------------------------------------------                                EP -                              PRIMARY      [facility]  Last Visit: 02-      CARE ([facility])   MEDICINE       Roge Gayle                               -                              PRIMARY      [facility]  Next Visit: 10-      CARE ([facility])   MEDICINE       Roge Gayle                                                            Last  Test          Frequency    Reason                     Performed    Due Date  --------------------------------------------------------------------------------    CMP.........  12 months..  hydroCHLOROthiazide,       11- 11-                             lisinopriL, metFORMIN....    HBA1C.......  6 months...  metFORMIN................  11-   05-    Health [facility] Embedded Care Gaps. Reference number: [id number]. 9/30/2022   10:55:58 AM CDT

## 2022-10-24 ENCOUNTER — OFFICE VISIT (OUTPATIENT)
Dept: FAMILY MEDICINE | Facility: CLINIC | Age: 52
End: 2022-10-24
Payer: COMMERCIAL

## 2022-10-24 VITALS
WEIGHT: 260.81 LBS | HEIGHT: 66 IN | OXYGEN SATURATION: 96 % | DIASTOLIC BLOOD PRESSURE: 86 MMHG | TEMPERATURE: 99 F | BODY MASS INDEX: 41.91 KG/M2 | HEART RATE: 91 BPM | SYSTOLIC BLOOD PRESSURE: 130 MMHG

## 2022-10-24 DIAGNOSIS — M54.30 SCIATICA, UNSPECIFIED LATERALITY: ICD-10-CM

## 2022-10-24 DIAGNOSIS — R73.9 HYPERGLYCEMIA: Primary | ICD-10-CM

## 2022-10-24 DIAGNOSIS — Z00.00 ROUTINE HEALTH MAINTENANCE: ICD-10-CM

## 2022-10-24 DIAGNOSIS — Z23 NEED FOR INFLUENZA VACCINATION: ICD-10-CM

## 2022-10-24 PROCEDURE — 1159F PR MEDICATION LIST DOCUMENTED IN MEDICAL RECORD: ICD-10-PCS | Mod: CPTII,S$GLB,, | Performed by: FAMILY MEDICINE

## 2022-10-24 PROCEDURE — 1160F RVW MEDS BY RX/DR IN RCRD: CPT | Mod: CPTII,S$GLB,, | Performed by: FAMILY MEDICINE

## 2022-10-24 PROCEDURE — 1159F MED LIST DOCD IN RCRD: CPT | Mod: CPTII,S$GLB,, | Performed by: FAMILY MEDICINE

## 2022-10-24 PROCEDURE — 4010F PR ACE/ARB THEARPY RXD/TAKEN: ICD-10-PCS | Mod: CPTII,S$GLB,, | Performed by: FAMILY MEDICINE

## 2022-10-24 PROCEDURE — 90686 IIV4 VACC NO PRSV 0.5 ML IM: CPT | Mod: S$GLB,,, | Performed by: FAMILY MEDICINE

## 2022-10-24 PROCEDURE — 99214 PR OFFICE/OUTPT VISIT, EST, LEVL IV, 30-39 MIN: ICD-10-PCS | Mod: 25,S$GLB,, | Performed by: FAMILY MEDICINE

## 2022-10-24 PROCEDURE — 1160F PR REVIEW ALL MEDS BY PRESCRIBER/CLIN PHARMACIST DOCUMENTED: ICD-10-PCS | Mod: CPTII,S$GLB,, | Performed by: FAMILY MEDICINE

## 2022-10-24 PROCEDURE — 90471 FLU VACCINE (QUAD) GREATER THAN OR EQUAL TO 3YO PRESERVATIVE FREE IM: ICD-10-PCS | Mod: S$GLB,,, | Performed by: FAMILY MEDICINE

## 2022-10-24 PROCEDURE — 3075F SYST BP GE 130 - 139MM HG: CPT | Mod: CPTII,S$GLB,, | Performed by: FAMILY MEDICINE

## 2022-10-24 PROCEDURE — 3079F DIAST BP 80-89 MM HG: CPT | Mod: CPTII,S$GLB,, | Performed by: FAMILY MEDICINE

## 2022-10-24 PROCEDURE — 3079F PR MOST RECENT DIASTOLIC BLOOD PRESSURE 80-89 MM HG: ICD-10-PCS | Mod: CPTII,S$GLB,, | Performed by: FAMILY MEDICINE

## 2022-10-24 PROCEDURE — 99214 OFFICE O/P EST MOD 30 MIN: CPT | Mod: 25,S$GLB,, | Performed by: FAMILY MEDICINE

## 2022-10-24 PROCEDURE — 4010F ACE/ARB THERAPY RXD/TAKEN: CPT | Mod: CPTII,S$GLB,, | Performed by: FAMILY MEDICINE

## 2022-10-24 PROCEDURE — 90686 FLU VACCINE (QUAD) GREATER THAN OR EQUAL TO 3YO PRESERVATIVE FREE IM: ICD-10-PCS | Mod: S$GLB,,, | Performed by: FAMILY MEDICINE

## 2022-10-24 PROCEDURE — 90471 IMMUNIZATION ADMIN: CPT | Mod: S$GLB,,, | Performed by: FAMILY MEDICINE

## 2022-10-24 PROCEDURE — 3075F PR MOST RECENT SYSTOLIC BLOOD PRESS GE 130-139MM HG: ICD-10-PCS | Mod: CPTII,S$GLB,, | Performed by: FAMILY MEDICINE

## 2022-10-24 NOTE — LETTER
October 24, 2022    Amanda Colbert  [address] [facility] [address]         [facility]  [address] [address]  Phone: [phone]  Fax: [phone] October 24, 2022     Patient: Amanda Colbert   YOB: 1970   Date of Visit: 10/24/2022       To Whom It May Concern:    It is my medical opinion that Amanda Colbert was seen in our office today.  She is unable to go to work at this time.  She may return to work full duty on Wed. Oct 26th with no restrictions.      If you have any questions or concerns, please don't hesitate to call.    Sincerely,        Roge Gayle MD

## 2022-10-30 NOTE — PROGRESS NOTES
" Patient ID: Amanda Colbert is a 51 y.o. female.    Chief Complaint: Follow-up and Sciatica    HPI      Amanda Colbert is a 51 y.o. female follow-up on sciatic pain.  Pain continues down her leg with neurologic symptoms however not as bad as it was previously.  No loss of strength or sensation.  No loss of urine bile-AK no red flags.    Vitals:    10/24/22 1035   BP: 130/86   BP Location: Left arm   Patient Position: Sitting   Pulse: 91   Temp: 98.5 °F (36.9 °C)   TempSrc: Oral   SpO2: 96%   Weight: 118.3 kg (260 lb 12.9 oz)   Height: 5' 6" (1.676 m)            Review of Symptoms      Physical Exam    Constitutional:  Oriented to person, place, and time.appears well-developed and well-nourished.  No distress.      HENT  Head: Normocephalic and atraumatic  Right Ear: External ear normal.   Left Ear: External ear normal.   Nose: External nose normal.   Mouth:  Moist mucus membranes.    Eyes:  Conjunctivae are normal. Right eye exhibits no discharge.  Left eye exhibits no discharge. No scleral icterus.  No periorbital edema    Cardiovascular:  Regular rate and rhythm with normal S1 and S2     Pulmonary/Chest:   Clear to auscultation bilaterally without wheezes, rhonchi or rales      Musculoskeletal:  No edema. No obvious deformity No wasting       Neurological:  Alert and oriented to person, place, and time.   Coordination normal.     Skin:   Skin is warm and dry.  No diaphoresis.   No rash noted.     Psychiatric: Normal mood and affect. Behavior is normal.  Judgment and thought content normal.     Complete Blood Count  Lab Results   Component Value Date    RBC 4.02 11/23/2021    HGB 12.8 11/23/2021    HCT 38.1 11/23/2021    MCV 95 11/23/2021    MCH 31.8 (H) 11/23/2021    MCHC 33.6 11/23/2021    RDW 13.4 11/23/2021     11/23/2021    MPV 10.1 11/23/2021    GRAN 7.1 11/23/2021    GRAN 63.5 11/23/2021    LYMPH 2.9 11/23/2021    LYMPH 26.4 11/23/2021    MONO 0.8 11/23/2021    MONO 6.9 11/23/2021    EOS 0.3 " 11/23/2021    BASO 0.04 11/23/2021    EOSINOPHIL 2.6 11/23/2021    BASOPHIL 0.4 11/23/2021    DIFFMETHOD Automated 11/23/2021       Comprehensive Metabolic Panel  No results found for: GLU, BUN, CREATININE, NA, K, CL, PROT, ALBUMIN, BILITOT, AST, ALKPHOS, CO2, ALT, ANIONGAP, EGFRNONAA, ESTGFRAFRICA    TSH  No results found for: TSH    Assessment / Plan:      ICD-10-CM ICD-9-CM   1. Hyperglycemia  R73.9 790.29   2. Need for influenza vaccination  Z23 V04.81   3. Routine health maintenance  Z00.00 V70.0   4. Sciatica, unspecified laterality  M54.30 724.3     Hyperglycemia  -     Comprehensive Metabolic Panel; Future  -     CBC Auto Differential; Future  -     Lipid Panel; Future  -     TSH; Future  -     Hemoglobin A1C; Future  -     Comprehensive Metabolic Panel; Future  -     CBC Auto Differential; Future  -     Lipid Panel; Future  -     TSH; Future  -     Hemoglobin A1C; Future    Need for influenza vaccination  -     Influenza - Quadrivalent *Preferred* (6 months+) (PF)    Routine health maintenance  -     Comprehensive Metabolic Panel; Future  -     CBC Auto Differential; Future  -     Lipid Panel; Future  -     TSH; Future  -     Hemoglobin A1C; Future  -     Comprehensive Metabolic Panel; Future  -     CBC Auto Differential; Future  -     Lipid Panel; Future  -     TSH; Future  -     Hemoglobin A1C; Future    Sciatica, unspecified laterality

## 2022-11-01 ENCOUNTER — LAB VISIT (OUTPATIENT)
Dept: LAB | Facility: HOSPITAL | Age: 52
End: 2022-11-01
Attending: FAMILY MEDICINE
Payer: COMMERCIAL

## 2022-11-01 DIAGNOSIS — R73.9 HYPERGLYCEMIA: ICD-10-CM

## 2022-11-01 DIAGNOSIS — Z00.00 ROUTINE HEALTH MAINTENANCE: ICD-10-CM

## 2022-11-01 LAB
ALBUMIN SERPL BCP-MCNC: 4.4 G/DL (ref 3.5–5.2)
ALP SERPL-CCNC: 84 U/L (ref 38–126)
ALT SERPL W/O P-5'-P-CCNC: 53 U/L (ref 10–44)
ANION GAP SERPL CALC-SCNC: 9 MMOL/L (ref 8–16)
AST SERPL-CCNC: 52 U/L (ref 15–46)
BASOPHILS # BLD AUTO: 0.04 K/UL (ref 0–0.2)
BASOPHILS NFR BLD: 0.5 % (ref 0–1.9)
BILIRUB SERPL-MCNC: 0.3 MG/DL (ref 0.1–1)
CALCIUM SERPL-MCNC: 9.4 MG/DL (ref 8.7–10.5)
CHLORIDE SERPL-SCNC: 101 MMOL/L (ref 95–110)
CHOLEST SERPL-MCNC: 261 MG/DL (ref 120–199)
CHOLEST/HDLC SERPL: 4.5 {RATIO} (ref 2–5)
CO2 SERPL-SCNC: 31 MMOL/L (ref 23–29)
CREAT SERPL-MCNC: 1.17 MG/DL (ref 0.5–1.4)
DIFFERENTIAL METHOD: ABNORMAL
EOSINOPHIL # BLD AUTO: 0.3 K/UL (ref 0–0.5)
EOSINOPHIL NFR BLD: 2.9 % (ref 0–8)
ERYTHROCYTE [DISTWIDTH] IN BLOOD BY AUTOMATED COUNT: 12.6 % (ref 11.5–14.5)
EST. GFR  (NO RACE VARIABLE): 55.8 ML/MIN/1.73 M^2
ESTIMATED AVG GLUCOSE: 151 MG/DL (ref 68–131)
GLUCOSE SERPL-MCNC: 164 MG/DL (ref 70–110)
HBA1C MFR BLD: 6.9 % (ref 4–5.6)
HCT VFR BLD AUTO: 38.9 % (ref 37–48.5)
HDLC SERPL-MCNC: 58 MG/DL (ref 40–75)
HDLC SERPL: 22.2 % (ref 20–50)
HGB BLD-MCNC: 12.7 G/DL (ref 12–16)
IMM GRANULOCYTES # BLD AUTO: 0.02 K/UL (ref 0–0.04)
IMM GRANULOCYTES NFR BLD AUTO: 0.2 % (ref 0–0.5)
LDLC SERPL CALC-MCNC: 164.2 MG/DL (ref 63–159)
LYMPHOCYTES # BLD AUTO: 2.5 K/UL (ref 1–4.8)
LYMPHOCYTES NFR BLD: 28.9 % (ref 18–48)
MCH RBC QN AUTO: 32 PG (ref 27–31)
MCHC RBC AUTO-ENTMCNC: 32.6 G/DL (ref 32–36)
MCV RBC AUTO: 98 FL (ref 82–98)
MONOCYTES # BLD AUTO: 0.6 K/UL (ref 0.3–1)
MONOCYTES NFR BLD: 6.3 % (ref 4–15)
NEUTROPHILS # BLD AUTO: 5.3 K/UL (ref 1.8–7.7)
NEUTROPHILS NFR BLD: 61.2 % (ref 38–73)
NONHDLC SERPL-MCNC: 203 MG/DL
NRBC BLD-RTO: 0 /100 WBC
PLATELET # BLD AUTO: 302 K/UL (ref 150–450)
PMV BLD AUTO: 10.4 FL (ref 9.2–12.9)
POTASSIUM SERPL-SCNC: 4.6 MMOL/L (ref 3.5–5.1)
PROT SERPL-MCNC: 7.6 G/DL (ref 6–8.4)
RBC # BLD AUTO: 3.97 M/UL (ref 4–5.4)
SODIUM SERPL-SCNC: 141 MMOL/L (ref 136–145)
TRIGL SERPL-MCNC: 194 MG/DL (ref 30–150)
TSH SERPL DL<=0.005 MIU/L-ACNC: 1.92 UIU/ML (ref 0.4–4)
UUN UR-MCNC: 18 MG/DL (ref 7–17)
WBC # BLD AUTO: 8.71 K/UL (ref 3.9–12.7)

## 2022-11-01 PROCEDURE — 84443 ASSAY THYROID STIM HORMONE: CPT | Mod: PO | Performed by: FAMILY MEDICINE

## 2022-11-01 PROCEDURE — 83036 HEMOGLOBIN GLYCOSYLATED A1C: CPT | Performed by: FAMILY MEDICINE

## 2022-11-01 PROCEDURE — 85025 COMPLETE CBC W/AUTO DIFF WBC: CPT | Mod: PO | Performed by: FAMILY MEDICINE

## 2022-11-01 PROCEDURE — 80053 COMPREHEN METABOLIC PANEL: CPT | Mod: PO | Performed by: FAMILY MEDICINE

## 2022-11-01 PROCEDURE — 80061 LIPID PANEL: CPT | Performed by: FAMILY MEDICINE

## 2022-11-01 PROCEDURE — 36415 COLL VENOUS BLD VENIPUNCTURE: CPT | Mod: PO | Performed by: FAMILY MEDICINE

## 2022-11-02 ENCOUNTER — TELEPHONE (OUTPATIENT)
Dept: FAMILY MEDICINE | Facility: CLINIC | Age: 52
End: 2022-11-02
Payer: COMMERCIAL

## 2022-11-02 DIAGNOSIS — R73.9 HYPERGLYCEMIA: Primary | ICD-10-CM

## 2022-11-02 DIAGNOSIS — Z00.00 ROUTINE GENERAL MEDICAL EXAMINATION AT A HEALTH CARE FACILITY: ICD-10-CM

## 2022-11-02 DIAGNOSIS — R74.8 ELEVATED LIVER ENZYMES: ICD-10-CM

## 2022-11-02 RX ORDER — ATORVASTATIN CALCIUM 20 MG/1
20 TABLET, FILM COATED ORAL DAILY
Qty: 90 TABLET | Refills: 3 | Status: SHIPPED | OUTPATIENT
Start: 2022-11-02 | End: 2023-10-08

## 2022-11-02 NOTE — TELEPHONE ENCOUNTER
Pt notified of lab results and had been scheduled for repeat labs    Pt would like to start cholesterol med. Pt uses [facility] in [facility]

## 2022-11-02 NOTE — TELEPHONE ENCOUNTER
Overall your lab work looks good except your cholesterol is a little elevated.  Would like to start you on a cholesterol-lowering medication.  And recheck your cholesterol and chemistry panels in three months.  Also would like to check your hemoglobin A1c in six months.

## 2023-02-06 ENCOUNTER — CLINICAL SUPPORT (OUTPATIENT)
Dept: FAMILY MEDICINE | Facility: CLINIC | Age: 53
End: 2023-02-06
Payer: COMMERCIAL

## 2023-02-06 DIAGNOSIS — Z00.00 ROUTINE GENERAL MEDICAL EXAMINATION AT A HEALTH CARE FACILITY: Primary | ICD-10-CM

## 2023-02-06 PROCEDURE — 81002 URINALYSIS NONAUTO W/O SCOPE: CPT | Mod: S$GLB,,, | Performed by: FAMILY MEDICINE

## 2023-02-06 PROCEDURE — 99202 PR OFFICE/OUTPT VISIT, NEW, LEVL II, 15-29 MIN: ICD-10-PCS | Mod: S$GLB,,, | Performed by: FAMILY MEDICINE

## 2023-02-06 PROCEDURE — 99202 OFFICE O/P NEW SF 15 MIN: CPT | Mod: S$GLB,,, | Performed by: FAMILY MEDICINE

## 2023-02-06 PROCEDURE — 81002 PR URINALYSIS NONAUTO W/O SCOPE: ICD-10-PCS | Mod: S$GLB,,, | Performed by: FAMILY MEDICINE

## 2023-03-15 DIAGNOSIS — Z12.31 OTHER SCREENING MAMMOGRAM: ICD-10-CM

## 2023-05-02 ENCOUNTER — HOSPITAL ENCOUNTER (OUTPATIENT)
Dept: RADIOLOGY | Facility: HOSPITAL | Age: 53
Discharge: HOME | End: 2023-05-02
Attending: FAMILY MEDICINE
Payer: COMMERCIAL

## 2023-05-02 DIAGNOSIS — Z12.31 OTHER SCREENING MAMMOGRAM: ICD-10-CM

## 2023-05-02 PROCEDURE — 77067 SCR MAMMO BI INCL CAD: CPT | Mod: TC,PO

## 2023-05-02 PROCEDURE — 77063 MAMMO DIGITAL SCREENING BILAT WITH TOMO: ICD-10-PCS | Mod: 26,,, | Performed by: RADIOLOGY

## 2023-05-02 PROCEDURE — 77063 BREAST TOMOSYNTHESIS BI: CPT | Mod: 26,,, | Performed by: RADIOLOGY

## 2023-05-02 PROCEDURE — 77067 SCR MAMMO BI INCL CAD: CPT | Mod: 26,,, | Performed by: RADIOLOGY

## 2023-05-02 PROCEDURE — 77067 MAMMO DIGITAL SCREENING BILAT WITH TOMO: ICD-10-PCS | Mod: 26,,, | Performed by: RADIOLOGY

## 2023-06-02 ENCOUNTER — PATIENT OUTREACH (OUTPATIENT)
Dept: ADMINISTRATIVE | Facility: HOSPITAL | Age: 53
End: 2023-06-02
Payer: COMMERCIAL

## 2023-06-21 ENCOUNTER — PATIENT MESSAGE (OUTPATIENT)
Dept: ADMINISTRATIVE | Facility: HOSPITAL | Age: 53
End: 2023-06-21
Payer: COMMERCIAL

## 2023-09-24 RX ORDER — LISINOPRIL 20 MG/1
20 TABLET ORAL DAILY
Qty: 90 TABLET | Refills: 3 | Status: SHIPPED | OUTPATIENT
Start: 2023-09-24

## 2023-09-24 RX ORDER — MELOXICAM 7.5 MG/1
7.5 TABLET ORAL DAILY
Qty: 90 TABLET | Refills: 1 | Status: SHIPPED | OUTPATIENT
Start: 2023-09-24 | End: 2023-10-24 | Stop reason: SDUPTHER

## 2023-09-24 NOTE — TELEPHONE ENCOUNTER
Care Due:                  Date            Visit Type   Department     Provider  --------------------------------------------------------------------------------                                EP -                              PRIMARY      [facility]  Last Visit: 10-      CARE ([place] [facility])   MEDICINE       Roge Gayle                              EP -                              PRIMARY      [facility]  Next Visit: 10-      CARE ([place] [facility])   MEDICINE       Roge Gayle                                                            Last  Test          Frequency    Reason                     Performed    Due Date  --------------------------------------------------------------------------------    CMP.........  12 months..  atorvastatin,              11-   10-                             hydroCHLOROthiazide,                             lisinopriL, metFORMIN....    HBA1C.......  6 months...  metFORMIN................  05-   10-    Lipid Panel.  12 months..  atorvastatin.............  11-   10-    Health [facility] Embedded Care Due Messages. Reference number: [id number].   9/24/2023 1:41:26 PM CDT

## 2023-10-05 ENCOUNTER — PATIENT OUTREACH (OUTPATIENT)
Dept: ADMINISTRATIVE | Facility: HOSPITAL | Age: 53
End: 2023-10-05
Payer: COMMERCIAL

## 2023-10-05 DIAGNOSIS — E11.9 DIABETES MELLITUS WITHOUT COMPLICATION: Primary | ICD-10-CM

## 2023-10-08 RX ORDER — ATORVASTATIN CALCIUM 20 MG/1
20 TABLET, FILM COATED ORAL
Qty: 90 TABLET | Refills: 0 | Status: SHIPPED | OUTPATIENT
Start: 2023-10-08 | End: 2024-01-05

## 2023-10-08 NOTE — TELEPHONE ENCOUNTER
Refill Decision Note   Amanda Colbert  is requesting a refill authorization.  Brief Assessment and Rationale for Refill:  Approve     Medication Therapy Plan:         Comments:     Note composed:4:55 PM 10/08/2023

## 2023-10-08 NOTE — TELEPHONE ENCOUNTER
No care due was identified.  Health [facility] Embedded Care Due Messages. Reference number: [id number].   10/08/2023 1:28:32 PM CDT

## 2023-10-23 RX ORDER — METOPROLOL TARTRATE 100 MG/1
100 TABLET ORAL
Qty: 90 TABLET | Refills: 0 | Status: SHIPPED | OUTPATIENT
Start: 2023-10-23 | End: 2023-10-24 | Stop reason: SDUPTHER

## 2023-10-23 NOTE — TELEPHONE ENCOUNTER
Care Due:                  Date            Visit Type   Department     Provider  --------------------------------------------------------------------------------                                EP -                              PRIMARY      [facility]  Last Visit: 10-      CARE ([facility])   MEDICINE       Roge Gayle                              EP -                              PRIMARY      [facility]  Next Visit: 10-      CARE ([facility])   MEDICINE       Roge Gayle                                                            Last  Test          Frequency    Reason                     Performed    Due Date  --------------------------------------------------------------------------------    CBC.........  12 months..  meloxicam................  11-   10-    Health [facility] Embedded Care Due Messages. Reference number: [id number].   10/23/2023 5:59:41 AM CDT

## 2023-10-24 ENCOUNTER — TELEPHONE (OUTPATIENT)
Dept: FAMILY MEDICINE | Facility: CLINIC | Age: 53
End: 2023-10-24

## 2023-10-24 ENCOUNTER — OFFICE VISIT (OUTPATIENT)
Dept: FAMILY MEDICINE | Facility: CLINIC | Age: 53
End: 2023-10-24
Payer: COMMERCIAL

## 2023-10-24 ENCOUNTER — PATIENT MESSAGE (OUTPATIENT)
Dept: FAMILY MEDICINE | Facility: CLINIC | Age: 53
End: 2023-10-24

## 2023-10-24 VITALS
HEIGHT: 66 IN | SYSTOLIC BLOOD PRESSURE: 110 MMHG | HEART RATE: 112 BPM | BODY MASS INDEX: 43.96 KG/M2 | DIASTOLIC BLOOD PRESSURE: 70 MMHG | OXYGEN SATURATION: 97 % | TEMPERATURE: 99 F | WEIGHT: 273.56 LBS

## 2023-10-24 DIAGNOSIS — E66.01 CLASS 3 OBESITY: ICD-10-CM

## 2023-10-24 DIAGNOSIS — R73.9 HYPERGLYCEMIA: ICD-10-CM

## 2023-10-24 DIAGNOSIS — Z23 NEED FOR PNEUMOCOCCAL VACCINATION: ICD-10-CM

## 2023-10-24 DIAGNOSIS — Z23 NEED FOR INFLUENZA VACCINATION: ICD-10-CM

## 2023-10-24 DIAGNOSIS — Z00.00 ROUTINE HEALTH MAINTENANCE: Primary | ICD-10-CM

## 2023-10-24 DIAGNOSIS — E11.9 TYPE 2 DIABETES MELLITUS WITHOUT COMPLICATION, WITHOUT LONG-TERM CURRENT USE OF INSULIN: ICD-10-CM

## 2023-10-24 DIAGNOSIS — R74.8 ELEVATED LIVER ENZYMES: ICD-10-CM

## 2023-10-24 PROBLEM — E66.813 CLASS 3 OBESITY: Status: ACTIVE | Noted: 2023-10-24

## 2023-10-24 PROCEDURE — 4010F ACE/ARB THERAPY RXD/TAKEN: CPT | Mod: CPTII,S$GLB,, | Performed by: FAMILY MEDICINE

## 2023-10-24 PROCEDURE — 4010F PR ACE/ARB THEARPY RXD/TAKEN: ICD-10-PCS | Mod: CPTII,S$GLB,, | Performed by: FAMILY MEDICINE

## 2023-10-24 PROCEDURE — 1159F MED LIST DOCD IN RCRD: CPT | Mod: CPTII,S$GLB,, | Performed by: FAMILY MEDICINE

## 2023-10-24 PROCEDURE — 3008F BODY MASS INDEX DOCD: CPT | Mod: CPTII,S$GLB,, | Performed by: FAMILY MEDICINE

## 2023-10-24 PROCEDURE — 90686 FLU VACCINE (QUAD) GREATER THAN OR EQUAL TO 3YO PRESERVATIVE FREE IM: ICD-10-PCS | Mod: S$GLB,,, | Performed by: FAMILY MEDICINE

## 2023-10-24 PROCEDURE — 3008F PR BODY MASS INDEX (BMI) DOCUMENTED: ICD-10-PCS | Mod: CPTII,S$GLB,, | Performed by: FAMILY MEDICINE

## 2023-10-24 PROCEDURE — 99396 PR PREVENTIVE VISIT,EST,40-64: ICD-10-PCS | Mod: S$GLB,,, | Performed by: FAMILY MEDICINE

## 2023-10-24 PROCEDURE — 3051F PR MOST RECENT HEMOGLOBIN A1C LEVEL 7.0 - < 8.0%: ICD-10-PCS | Mod: CPTII,S$GLB,, | Performed by: FAMILY MEDICINE

## 2023-10-24 PROCEDURE — 1159F PR MEDICATION LIST DOCUMENTED IN MEDICAL RECORD: ICD-10-PCS | Mod: CPTII,S$GLB,, | Performed by: FAMILY MEDICINE

## 2023-10-24 PROCEDURE — 99396 PREV VISIT EST AGE 40-64: CPT | Mod: S$GLB,,, | Performed by: FAMILY MEDICINE

## 2023-10-24 PROCEDURE — 3078F PR MOST RECENT DIASTOLIC BLOOD PRESSURE < 80 MM HG: ICD-10-PCS | Mod: CPTII,S$GLB,, | Performed by: FAMILY MEDICINE

## 2023-10-24 PROCEDURE — 90686 IIV4 VACC NO PRSV 0.5 ML IM: CPT | Mod: S$GLB,,, | Performed by: FAMILY MEDICINE

## 2023-10-24 PROCEDURE — G0008 FLU VACCINE (QUAD) GREATER THAN OR EQUAL TO 3YO PRESERVATIVE FREE IM: ICD-10-PCS | Mod: S$GLB,,, | Performed by: FAMILY MEDICINE

## 2023-10-24 PROCEDURE — 3074F SYST BP LT 130 MM HG: CPT | Mod: CPTII,S$GLB,, | Performed by: FAMILY MEDICINE

## 2023-10-24 PROCEDURE — 3078F DIAST BP <80 MM HG: CPT | Mod: CPTII,S$GLB,, | Performed by: FAMILY MEDICINE

## 2023-10-24 PROCEDURE — 3074F PR MOST RECENT SYSTOLIC BLOOD PRESSURE < 130 MM HG: ICD-10-PCS | Mod: CPTII,S$GLB,, | Performed by: FAMILY MEDICINE

## 2023-10-24 PROCEDURE — G0008 ADMIN INFLUENZA VIRUS VAC: HCPCS | Mod: S$GLB,,, | Performed by: FAMILY MEDICINE

## 2023-10-24 PROCEDURE — 3051F HG A1C>EQUAL 7.0%<8.0%: CPT | Mod: CPTII,S$GLB,, | Performed by: FAMILY MEDICINE

## 2023-10-24 RX ORDER — MELOXICAM 7.5 MG/1
7.5 TABLET ORAL DAILY
Qty: 90 TABLET | Refills: 1 | Status: SHIPPED | OUTPATIENT
Start: 2023-10-24

## 2023-10-24 RX ORDER — METFORMIN HYDROCHLORIDE 500 MG/1
500 TABLET ORAL 2 TIMES DAILY WITH MEALS
Qty: 180 TABLET | Refills: 3 | Status: SHIPPED | OUTPATIENT
Start: 2023-10-24 | End: 2024-10-23

## 2023-10-24 RX ORDER — HYDROCHLOROTHIAZIDE 12.5 MG/1
12.5 CAPSULE ORAL
COMMUNITY
End: 2023-10-24 | Stop reason: SDUPTHER

## 2023-10-24 RX ORDER — METOPROLOL TARTRATE 100 MG/1
50 TABLET ORAL 2 TIMES DAILY
Qty: 90 TABLET | Refills: 1 | Status: SHIPPED | OUTPATIENT
Start: 2023-10-24

## 2023-10-24 RX ORDER — HYDROCHLOROTHIAZIDE 12.5 MG/1
12.5 CAPSULE ORAL DAILY
Qty: 90 CAPSULE | Refills: 3 | Status: SHIPPED | OUTPATIENT
Start: 2023-10-24

## 2023-10-24 RX ORDER — TIRZEPATIDE 2.5 MG/.5ML
2.5 INJECTION, SOLUTION SUBCUTANEOUS
Qty: 4 PEN | Refills: 1 | Status: SHIPPED | OUTPATIENT
Start: 2023-10-24 | End: 2023-11-15

## 2023-10-24 RX ORDER — METOPROLOL TARTRATE 100 MG/1
50 TABLET ORAL 2 TIMES DAILY
Qty: 90 TABLET | Refills: 1 | Status: SHIPPED | OUTPATIENT
Start: 2023-10-24 | End: 2023-10-24 | Stop reason: SDUPTHER

## 2023-10-24 NOTE — TELEPHONE ENCOUNTER
Refill Routing Note   Medication(s) are not appropriate for processing by [facility] for the following reason(s):      Required vitals outdated    [facility] action(s):  Defer Care Due:  Labs due            Appointments  past 12m or future 3m with PCP    Date Provider   Last Visit   2/6/2023 Roge Gayle MD   Next Visit   10/24/2023 Roge Gayle MD   ED visits in past 90 days: 0        Note composed:8:43 PM 10/23/2023

## 2023-10-24 NOTE — PROGRESS NOTES
Referral ordered for [facility] eye [place] in [facility]. LEIGH signed

## 2023-10-24 NOTE — TELEPHONE ENCOUNTER
----- Message from Ashly Pereira sent at 10/24/2023 10:12 AM CDT -----  Type:  Pharmacy Calling to Clarify an RX    [facility]    Prescription Name:metoprolol tartrate (LOPRESSOR) 100 MG tablet    What do they need to clarify?:metoprolol tartrate (LOPRESSOR) 100 MG tablet has two different directions    Best Call Back Number:[phone]    Additional Information: [facility] [address] [place] [address] [address] [address]  Phone: [phone] Fax: [phone]  Hours: Not open 24 hours

## 2023-10-29 NOTE — PROGRESS NOTES
" Patient ID: Amanda Colbert is a 52 y.o. female.    Chief Complaint: Annual Exam and Fatigue    HPI      Amanda Colbert is a 52 y.o. female. here for annual exam.   At this point in time blood pressure under good control with current medications.  Diabetes under good control with current medications.  Complains of mild fatigue daytime not exercising routinely.  Overall doing very well.        Review of Symptoms    Constitutional: Negative.    HENT: Negative.    Eyes: Negative.    Respiratory: Negative.    Cardiovascular: Negative.    Gastrointestinal: Negative.    Endocrine: Negative.    Genitourinary: Negative.    Musculoskeletal: Negative.    Skin: Negative.    Allergic/Immunologic: Negative.    Neurological: Negative.    Hematological: Negative.    Psychiatric/Behavioral: Negative.      Except as above in HPI      Vitals:    10/24/23 0939   BP: 110/70   BP Location: Left arm   Patient Position: Sitting   Pulse: (!) 112   Temp: 98.6 °F (37 °C)   TempSrc: Oral   SpO2: 97%   Weight: 124.1 kg (273 lb 9.5 oz)   Height: 5' 6" (1.676 m)        Physical  Exam      Constitutional:  Oriented to person, place, and time. Appears well-developed and well-nourished.     HENT:   Head: Normocephalic and atraumatic.     Right Ear: Tympanic membrane, ear canal and External ear normal     Left Ear: Tympanic membrane, ear canal and External ear normal     Nose: Nose normal. No rhinorrhea or nasal deformity.     Mouth/Throat: Uvula is midline, oropharynx is clear and moist and mucous membranes are normal.      Eyes: Conjunctivae are normal. Right eye exhibits no discharge. Left eye exhibits no discharge. No scleral icterus.     Neck:  No JVD present. No tracheal deviation  []  Neck supple.   []  No Carotid bruit    Cardiovascular:  Regular rate and rhythm with normal S1 and S2     Pulmonary/Chest:   Clear to auscultation bilaterally without wheezes, rhonchi or rales    Musculoskeletal: Normal range of motion. No edema or tenderness. " "  No deformity     Lymphadenopathy:  No cervical adenopathy.     Neurological:  Alert and oriented to person, place, and time. Coordination normal.     Skin: Skin is warm and dry. No rash noted.     Psychiatric: Normal mood and affect. Speech is normal and behavior is normal. Judgment and thought content normal.     Complete Blood Count  Lab Results   Component Value Date    RBC 3.97 (L) 11/01/2022    HGB 12.7 11/01/2022    HCT 38.9 11/01/2022    MCV 98 11/01/2022    MCH 32.0 (H) 11/01/2022    MCHC 32.6 11/01/2022    RDW 12.6 11/01/2022     11/01/2022    MPV 10.4 11/01/2022    GRAN 5.3 11/01/2022    GRAN 61.2 11/01/2022    LYMPH 2.5 11/01/2022    LYMPH 28.9 11/01/2022    MONO 0.6 11/01/2022    MONO 6.3 11/01/2022    EOS 0.3 11/01/2022    BASO 0.04 11/01/2022    EOSINOPHIL 2.9 11/01/2022    BASOPHIL 0.5 11/01/2022    DIFFMETHOD Automated 11/01/2022       Comprehensive Metabolic Panel  No results found for: "GLU", "BUN", "CREATININE", "NA", "K", "CL", "PROT", "ALBUMIN", "BILITOT", "AST", "ALKPHOS", "CO2", "ALT", "ANIONGAP", "EGFRNONAA", "ESTGFRAFRICA"    TSH  No results found for: "TSH"    Assessment / Plan:      ICD-10-CM ICD-9-CM   1. Routine health maintenance  Z00.00 V70.0   2. Need for influenza vaccination  Z23 V04.81   3. Need for pneumococcal vaccination  Z23 V03.82   4. Class 3 obesity  E66.01 278.01   5. Hyperglycemia  R73.9 790.29   6. Elevated liver enzymes  R74.8 790.5   7. Type 2 diabetes mellitus without complication, without long-term current use of insulin  E11.9 250.00     Routine health maintenance  -     Ambulatory referral/consult to Ophthalmology; Future; Expected date: 10/31/2023  -     Comprehensive Metabolic Panel; Future  -     CBC Auto Differential; Future  -     Lipid Panel; Future  -     TSH; Future  -     Hemoglobin A1C; Future  -     Microalbumin/Creatinine Ratio, Urine; Future; Expected date: 10/24/2023    Need for influenza vaccination  -     Influenza - Quadrivalent *Preferred* " (6 months+) (PF)    Need for pneumococcal vaccination  -     Cancel: Pneumococcal Conjugate Vaccine (20 Valent) (IM)(Preferred)    Class 3 obesity    Hyperglycemia  -     Comprehensive Metabolic Panel; Future  -     CBC Auto Differential; Future  -     Lipid Panel; Future  -     TSH; Future  -     Hemoglobin A1C; Future  -     Microalbumin/Creatinine Ratio, Urine; Future; Expected date: 10/24/2023    Elevated liver enzymes  -     Comprehensive Metabolic Panel; Future  -     CBC Auto Differential; Future  -     Lipid Panel; Future  -     TSH; Future  -     Hemoglobin A1C; Future  -     Microalbumin/Creatinine Ratio, Urine; Future; Expected date: 10/24/2023    Type 2 diabetes mellitus without complication, without long-term current use of insulin  -     tirzepatide (MOUNJARO) 2.5 mg/0.5 mL PnIj; Inject 2.5 mg into the skin every 7 days.  Dispense: 4 Pen; Refill: 1  -     Hemoglobin A1C; Future; Expected date: 10/24/2023    Other orders  -     Discontinue: metoprolol tartrate (LOPRESSOR) 100 MG tablet; Take 0.5 tablets (50 mg total) by mouth 2 (two) times daily. Take 1 tablet by mouth once daily  Dispense: 90 tablet; Refill: 1  -     hydroCHLOROthiazide (MICROZIDE) 12.5 mg capsule; Take 1 capsule (12.5 mg total) by mouth once daily.  Dispense: 90 capsule; Refill: 3  -     meloxicam (MOBIC) 7.5 MG tablet; Take 1 tablet (7.5 mg total) by mouth once daily.  Dispense: 90 tablet; Refill: 1  -     metFORMIN (GLUCOPHAGE) 500 MG tablet; Take 1 tablet (500 mg total) by mouth 2 (two) times daily with meals.  Dispense: 180 tablet; Refill: 3    Exercise-calisthenics type things yoga Pilates      Discussed how to stay healthy including: diet, and exercise.

## 2023-11-03 ENCOUNTER — LAB VISIT (OUTPATIENT)
Dept: LAB | Facility: HOSPITAL | Age: 53
End: 2023-11-03
Attending: FAMILY MEDICINE
Payer: COMMERCIAL

## 2023-11-03 DIAGNOSIS — R73.9 HYPERGLYCEMIA: ICD-10-CM

## 2023-11-03 DIAGNOSIS — R74.8 ELEVATED LIVER ENZYMES: ICD-10-CM

## 2023-11-03 DIAGNOSIS — Z00.00 ROUTINE HEALTH MAINTENANCE: ICD-10-CM

## 2023-11-03 LAB
ALBUMIN/CREAT UR: NORMAL UG/MG (ref 0–30)
CREAT UR-MCNC: 79 MG/DL (ref 15–325)
MICROALBUMIN UR DL<=1MG/L-MCNC: <5 UG/ML

## 2023-11-03 PROCEDURE — 82570 ASSAY OF URINE CREATININE: CPT | Mod: PN | Performed by: FAMILY MEDICINE

## 2023-11-15 ENCOUNTER — TELEPHONE (OUTPATIENT)
Dept: FAMILY MEDICINE | Facility: CLINIC | Age: 53
End: 2023-11-15
Payer: COMMERCIAL

## 2023-11-15 DIAGNOSIS — E11.9 TYPE 2 DIABETES MELLITUS WITHOUT COMPLICATION, WITHOUT LONG-TERM CURRENT USE OF INSULIN: Primary | ICD-10-CM

## 2023-11-15 RX ORDER — TIRZEPATIDE 5 MG/.5ML
5 INJECTION, SOLUTION SUBCUTANEOUS
Qty: 12 PEN | Refills: 1 | Status: SHIPPED | OUTPATIENT
Start: 2023-11-15

## 2023-11-15 NOTE — TELEPHONE ENCOUNTER
Spoke to pt. Stated she is taking all of her medication.    Pt also stated she no longer exercises and has not been watching what she eats due to stress and changes with her job.   Pt also stated she gained back the 23 lbs she lost previously.     What do you suggest?

## 2023-11-15 NOTE — TELEPHONE ENCOUNTER
----- Message from Roge Gayle MD sent at 11/13/2023  7:49 PM CST -----  Your lab work shows excellent cholesterol control and electrolytes are normal except for your hemoglobin A1c which is elevated at 9.2.  Previously was 7.2.  Have you discontinue some of the medications -if not we need to increase from 2 milligrams to five and 7 milligrams of the Mounjaro or switch to something else.

## 2023-11-16 NOTE — TELEPHONE ENCOUNTER
Attempted to reach pt. Left detailed message regarding provider rec.     Instructed to call back to schedule lab

## 2023-11-16 NOTE — TELEPHONE ENCOUNTER
I suggest increasing Mounjaro from 2.5 milligrams to 5 milligrams.  Get back on your weight loss treatment    Recheck ha1c just before  the new year

## 2023-12-07 ENCOUNTER — TELEPHONE (OUTPATIENT)
Dept: FAMILY MEDICINE | Facility: CLINIC | Age: 53
End: 2023-12-07
Payer: COMMERCIAL

## 2023-12-07 DIAGNOSIS — E11.9 TYPE 2 DIABETES MELLITUS WITHOUT COMPLICATION, WITHOUT LONG-TERM CURRENT USE OF INSULIN: Primary | ICD-10-CM

## 2023-12-07 LAB
LEFT EYE DM RETINOPATHY: NEGATIVE
RIGHT EYE DM RETINOPATHY: NEGATIVE

## 2023-12-07 NOTE — TELEPHONE ENCOUNTER
I have spoken to pt and she stated she had completed her eye exam and the [facility] in [facility] on 12/7/23. That Dr. Gayle is requesting the report.

## 2023-12-07 NOTE — TELEPHONE ENCOUNTER
----- Message from Billy Burkett sent at 12/7/2023 11:16 AM CST -----  Contact: pt  Type: Requesting to speak with nurse        Who Called: PT  Regarding: please send referral to [facility] for eye exam   Would the patient rather a call back or a response via [facility]? Call back to verify   Best Call Back Number:  [phone]  Additional Information:

## 2023-12-08 ENCOUNTER — PATIENT OUTREACH (OUTPATIENT)
Dept: ADMINISTRATIVE | Facility: HOSPITAL | Age: 53
End: 2023-12-08
Payer: COMMERCIAL

## 2023-12-08 NOTE — LETTER
AUTHORIZATION FOR RELEASE OF   CONFIDENTIAL INFORMATION    [facility] [place],    We are seeing Amanda Colbert, date of birth 1970, in the clinic at [facility]. Roge Gayle MD is the patient's PCP. Amanda Colbert has an outstanding lab/procedure at the time we reviewed her chart. In order to help keep her health information updated, she has authorized us to request the following medical record(s):                        ( X )  EYE EXAM                  Please fax records to [facility], [place], Andrew J., [place], [phone]    If you have any questions, please contact Shelli at [phone].             Patient Name: Amanda Colbert  : 1970  Patient Phone #: [phone]

## 2023-12-19 ENCOUNTER — LAB VISIT (OUTPATIENT)
Dept: LAB | Facility: HOSPITAL | Age: 53
End: 2023-12-19
Attending: FAMILY MEDICINE
Payer: COMMERCIAL

## 2023-12-19 DIAGNOSIS — E11.9 TYPE 2 DIABETES MELLITUS WITHOUT COMPLICATION, WITHOUT LONG-TERM CURRENT USE OF INSULIN: ICD-10-CM

## 2023-12-19 LAB
ESTIMATED AVG GLUCOSE: 169 MG/DL (ref 68–131)
HBA1C MFR BLD: 7.5 % (ref 4–5.6)

## 2023-12-19 PROCEDURE — 36415 COLL VENOUS BLD VENIPUNCTURE: CPT | Mod: PN | Performed by: FAMILY MEDICINE

## 2023-12-19 PROCEDURE — 83036 HEMOGLOBIN GLYCOSYLATED A1C: CPT | Performed by: FAMILY MEDICINE

## 2023-12-21 ENCOUNTER — PATIENT OUTREACH (OUTPATIENT)
Dept: ADMINISTRATIVE | Facility: HOSPITAL | Age: 53
End: 2023-12-21
Payer: COMMERCIAL

## 2024-01-05 RX ORDER — ATORVASTATIN CALCIUM 20 MG/1
20 TABLET, FILM COATED ORAL
Qty: 90 TABLET | Refills: 3 | Status: SHIPPED | OUTPATIENT
Start: 2024-01-05

## 2024-01-05 NOTE — TELEPHONE ENCOUNTER
Refill Decision Note   Amanda Colbert  is requesting a refill authorization.  Brief Assessment and Rationale for Refill:  Approve     Medication Therapy Plan:         Comments:     Note composed:8:04 AM 01/05/2024

## 2024-01-05 NOTE — TELEPHONE ENCOUNTER
No care due was identified.  Health [facility] Embedded Care Due Messages. Reference number: [id number].   1/05/2024 7:02:03 AM CST

## 2024-04-13 DIAGNOSIS — E11.9 TYPE 2 DIABETES MELLITUS WITHOUT COMPLICATION, WITHOUT LONG-TERM CURRENT USE OF INSULIN: ICD-10-CM

## 2024-04-13 NOTE — TELEPHONE ENCOUNTER
Care Due:                  Date            Visit Type   Department     Provider  --------------------------------------------------------------------------------                                EP -                              PRIMARY      [facility]  Last Visit: 10-      CARE ([facility])   MEDICINE       Roge Gayle  Next Visit: None Scheduled  None         None Found                                                            Last  Test          Frequency    Reason                     Performed    Due Date  --------------------------------------------------------------------------------    HBA1C.......  6 months...  metFORMIN, tirzepatide...  12- 06-    [facility] Embedded Care Due Messages. Reference number: [id number].   4/13/2024 7:02:24 AM CDT

## 2024-04-14 RX ORDER — TIRZEPATIDE 5 MG/.5ML
INJECTION, SOLUTION SUBCUTANEOUS
Qty: 12 PEN | Refills: 0 | Status: SHIPPED | OUTPATIENT
Start: 2024-04-14

## 2024-04-14 NOTE — TELEPHONE ENCOUNTER
Provider Staff:  Action required for this patient    Requires labs      Please see care gap opportunities below in Care Due Message.    Thanks!  [facility]     Appointments      Date Provider   Last Visit   10/24/2023 Roge Gayle MD   Next Visit   Visit date not found Roge Gayle MD     Refill Decision Note   Amanda Colbert  is requesting a refill authorization.  Brief Assessment and Rationale for Refill:  Approve     Medication Therapy Plan:         Comments:     Note composed:7:55 AM 04/14/2024

## 2024-04-15 ENCOUNTER — PATIENT MESSAGE (OUTPATIENT)
Dept: FAMILY MEDICINE | Facility: CLINIC | Age: 54
End: 2024-04-15
Payer: COMMERCIAL

## 2024-04-29 ENCOUNTER — PATIENT MESSAGE (OUTPATIENT)
Dept: ADMINISTRATIVE | Facility: HOSPITAL | Age: 54
End: 2024-04-29
Payer: COMMERCIAL

## 2024-05-09 ENCOUNTER — OCCUPATIONAL HEALTH (OUTPATIENT)
Dept: URGENT CARE | Facility: CLINIC | Age: 54
End: 2024-05-09

## 2024-05-09 DIAGNOSIS — Z13.9 ENCOUNTER FOR SCREENING: Primary | ICD-10-CM

## 2024-05-09 LAB — BREATH ALCOHOL: 0

## 2024-05-09 PROCEDURE — 99199 UNLISTED SPECIAL SVC PX/RPRT: CPT | Mod: S$GLB,,,

## 2024-05-09 PROCEDURE — 82075 ASSAY OF BREATH ETHANOL: CPT | Mod: S$GLB,,,

## 2024-05-09 PROCEDURE — 80305 DRUG TEST PRSMV DIR OPT OBS: CPT | Mod: S$GLB,,,

## 2024-05-26 NOTE — TELEPHONE ENCOUNTER
No care due was identified.  [facility] Embedded Care Due Messages. Reference number: [id number].   5/26/2024 6:28:10 PM CDT

## 2024-05-27 RX ORDER — METOPROLOL TARTRATE 100 MG/1
50 TABLET ORAL 2 TIMES DAILY
Qty: 90 TABLET | Refills: 1 | Status: SHIPPED | OUTPATIENT
Start: 2024-05-27

## 2024-06-06 ENCOUNTER — HOSPITAL ENCOUNTER (OUTPATIENT)
Dept: RADIOLOGY | Facility: HOSPITAL | Age: 54
Discharge: HOME | End: 2024-06-06
Attending: FAMILY MEDICINE
Payer: COMMERCIAL

## 2024-06-06 VITALS — WEIGHT: 273 LBS | HEIGHT: 66 IN | BODY MASS INDEX: 43.87 KG/M2

## 2024-06-06 DIAGNOSIS — Z12.31 ENCOUNTER FOR SCREENING MAMMOGRAM FOR BREAST CANCER: ICD-10-CM

## 2024-06-06 PROCEDURE — 77067 SCR MAMMO BI INCL CAD: CPT | Mod: TC,PN

## 2024-06-06 PROCEDURE — 77063 BREAST TOMOSYNTHESIS BI: CPT | Mod: 26,,, | Performed by: RADIOLOGY

## 2024-06-06 PROCEDURE — 77067 SCR MAMMO BI INCL CAD: CPT | Mod: 26,,, | Performed by: RADIOLOGY

## 2024-06-19 ENCOUNTER — LAB VISIT (OUTPATIENT)
Dept: LAB | Facility: HOSPITAL | Age: 54
End: 2024-06-19
Attending: FAMILY MEDICINE
Payer: COMMERCIAL

## 2024-06-19 DIAGNOSIS — E11.9 TYPE 2 DIABETES MELLITUS WITHOUT COMPLICATION, WITHOUT LONG-TERM CURRENT USE OF INSULIN: ICD-10-CM

## 2024-06-19 LAB
ESTIMATED AVG GLUCOSE: 131 MG/DL (ref 68–131)
HBA1C MFR BLD: 6.2 % (ref 4–5.6)

## 2024-06-19 PROCEDURE — 36415 COLL VENOUS BLD VENIPUNCTURE: CPT | Mod: PN | Performed by: FAMILY MEDICINE

## 2024-06-19 PROCEDURE — 83036 HEMOGLOBIN GLYCOSYLATED A1C: CPT | Performed by: FAMILY MEDICINE

## 2024-07-19 ENCOUNTER — OFFICE VISIT (OUTPATIENT)
Dept: FAMILY MEDICINE | Facility: CLINIC | Age: 54
End: 2024-07-19
Payer: COMMERCIAL

## 2024-07-19 ENCOUNTER — TELEPHONE (OUTPATIENT)
Dept: FAMILY MEDICINE | Facility: CLINIC | Age: 54
End: 2024-07-19

## 2024-07-19 VITALS
SYSTOLIC BLOOD PRESSURE: 130 MMHG | WEIGHT: 258.06 LBS | BODY MASS INDEX: 41.47 KG/M2 | TEMPERATURE: 98 F | HEART RATE: 95 BPM | OXYGEN SATURATION: 98 % | HEIGHT: 66 IN | DIASTOLIC BLOOD PRESSURE: 86 MMHG

## 2024-07-19 DIAGNOSIS — Z12.31 ENCOUNTER FOR SCREENING MAMMOGRAM FOR BREAST CANCER: ICD-10-CM

## 2024-07-19 DIAGNOSIS — Z00.00 ROUTINE HEALTH MAINTENANCE: Primary | ICD-10-CM

## 2024-07-19 DIAGNOSIS — E66.01 CLASS 3 OBESITY: ICD-10-CM

## 2024-07-19 DIAGNOSIS — E11.9 TYPE 2 DIABETES MELLITUS WITHOUT COMPLICATION, WITHOUT LONG-TERM CURRENT USE OF INSULIN: ICD-10-CM

## 2024-07-19 PROCEDURE — 1159F MED LIST DOCD IN RCRD: CPT | Mod: CPTII,S$GLB,, | Performed by: FAMILY MEDICINE

## 2024-07-19 PROCEDURE — 1160F RVW MEDS BY RX/DR IN RCRD: CPT | Mod: CPTII,S$GLB,, | Performed by: FAMILY MEDICINE

## 2024-07-19 PROCEDURE — 3079F DIAST BP 80-89 MM HG: CPT | Mod: CPTII,S$GLB,, | Performed by: FAMILY MEDICINE

## 2024-07-19 PROCEDURE — 3008F BODY MASS INDEX DOCD: CPT | Mod: CPTII,S$GLB,, | Performed by: FAMILY MEDICINE

## 2024-07-19 PROCEDURE — 3044F HG A1C LEVEL LT 7.0%: CPT | Mod: CPTII,S$GLB,, | Performed by: FAMILY MEDICINE

## 2024-07-19 PROCEDURE — 99396 PREV VISIT EST AGE 40-64: CPT | Mod: S$GLB,,, | Performed by: FAMILY MEDICINE

## 2024-07-19 PROCEDURE — 3075F SYST BP GE 130 - 139MM HG: CPT | Mod: CPTII,S$GLB,, | Performed by: FAMILY MEDICINE

## 2024-07-19 PROCEDURE — 4010F ACE/ARB THERAPY RXD/TAKEN: CPT | Mod: CPTII,S$GLB,, | Performed by: FAMILY MEDICINE

## 2024-07-19 NOTE — TELEPHONE ENCOUNTER
----- Message from Nayla Garcia sent at 7/19/2024  4:23 PM CDT -----  Contact: [place]  Type:  Needs Medical Advice    Who Called: [facility] hugo Authorization DEPT   Symptoms (please be specific): approval-- medication Mounjaro 7.5  --good from 7/19-8/02/2024 approval is cover [facility]  reference number [id number]  Would the patient rather a call back or a response via [facility]? call  Best Call Back Number: [phone]  Additional Information:

## 2024-07-19 NOTE — PROGRESS NOTES
" Patient ID: Amanda Colbert is a 53 y.o. female.    Chief Complaint: Follow-up    HPI      Amanda Colbert is a 53 y.o. female. here for annual exam.   No current complaints.        Review of Symptoms    Constitutional: Negative.    HENT: Negative.    Eyes: Negative.    Respiratory: Negative.    Cardiovascular: Negative.    Gastrointestinal: Negative.    Endocrine: Negative.    Genitourinary: Negative.    Musculoskeletal: Negative.    Skin: Negative.    Allergic/Immunologic: Negative.    Neurological: Negative.    Hematological: Negative.    Psychiatric/Behavioral: Negative.      Except as above in HPI      Vitals:    07/19/24 0941   BP: 130/86   BP Location: Left arm   Patient Position: Sitting   Pulse: 95   Temp: 98.3 °F (36.8 °C)   TempSrc: Oral   SpO2: 98%   Weight: 117 kg (258 lb 0.8 oz)   Height: 5' 5.98" (1.676 m)        Physical  Exam      Constitutional:  Oriented to person, place, and time. Appears well-developed and well-nourished.     HENT:   Head: Normocephalic and atraumatic.     Right Ear: Tympanic membrane, ear canal and External ear normal     Left Ear: Tympanic membrane, ear canal and External ear normal     Nose: Nose normal. No rhinorrhea or nasal deformity.     Mouth/Throat: Uvula is midline, oropharynx is clear and moist and mucous membranes are normal.      Eyes: Conjunctivae are normal. Right eye exhibits no discharge. Left eye exhibits no discharge. No scleral icterus.     Neck:  No JVD present. No tracheal deviation  []  Neck supple.   []  No Carotid bruit    Cardiovascular:  Regular rate and rhythm with normal S1 and S2     Pulmonary/Chest:   Clear to auscultation bilaterally without wheezes, rhonchi or rales    Musculoskeletal: Normal range of motion. No edema or tenderness.   No deformity     Lymphadenopathy:  No cervical adenopathy.     Neurological:  Alert and oriented to person, place, and time. Coordination normal.     Skin: Skin is warm and dry. No rash noted.     Psychiatric: " "Normal mood and affect. Speech is normal and behavior is normal. Judgment and thought content normal.     Complete Blood Count  Lab Results   Component Value Date    RBC 3.86 (L) 11/03/2023    HGB 12.0 11/03/2023    HCT 37.0 11/03/2023    MCV 96 11/03/2023    MCH 31.1 (H) 11/03/2023    MCHC 32.4 11/03/2023    RDW 13.6 11/03/2023     11/03/2023    MPV 10.3 11/03/2023    GRAN 5.3 11/03/2023    GRAN 58.2 11/03/2023    LYMPH 2.8 11/03/2023    LYMPH 30.2 11/03/2023    MONO 0.7 11/03/2023    MONO 7.9 11/03/2023    EOS 0.3 11/03/2023    BASO 0.03 11/03/2023    EOSINOPHIL 3.2 11/03/2023    BASOPHIL 0.3 11/03/2023    DIFFMETHOD Automated 11/03/2023       Comprehensive Metabolic Panel  No results found for: "GLU", "BUN", "CREATININE", "NA", "K", "CL", "PROT", "ALBUMIN", "BILITOT", "AST", "ALKPHOS", "CO2", "ALT", "ANIONGAP", "EGFRNONAA", "ESTGFRAFRICA"    TSH  No results found for: "TSH"    Assessment / Plan:      ICD-10-CM ICD-9-CM   1. Routine health maintenance  Z00.00 V70.0   2. Type 2 diabetes mellitus without complication, without long-term current use of insulin  E11.9 250.00   3. Class 3 obesity  E66.01 278.01   4. Encounter for screening mammogram for breast cancer  Z12.31 V76.12     Routine health maintenance  -     Lipid Panel; Future; Expected date: 07/19/2024  -     TSH; Future; Expected date: 07/19/2024  -     Comprehensive Metabolic Panel; Future; Expected date: 07/19/2024  -     Microalbumin/Creatinine Ratio, Urine; Future; Expected date: 07/19/2024  -     Hemoglobin A1C; Future; Expected date: 07/19/2024  -     CBC Auto Differential; Future; Expected date: 07/19/2024    Type 2 diabetes mellitus without complication, without long-term current use of insulin  -     Lipid Panel; Future; Expected date: 07/19/2024  -     TSH; Future; Expected date: 07/19/2024  -     Comprehensive Metabolic Panel; Future; Expected date: 07/19/2024  -     Microalbumin/Creatinine Ratio, Urine; Future; Expected date: " 07/19/2024  -     Hemoglobin A1C; Future; Expected date: 07/19/2024  -     CBC Auto Differential; Future; Expected date: 07/19/2024    Class 3 obesity    Encounter for screening mammogram for breast cancer  -     Mammo Digital Screening Bilat w/ Maikel; Future; Expected date: 07/19/2025    Other orders  -     tirzepatide 7.5 mg/0.5 mL PnIj; Inject 7.5 mg into the skin every 7 days.  Dispense: 12 mL; Refill: 1          Discussed how to stay healthy including: diet, and exercise.

## 2024-09-19 RX ORDER — LISINOPRIL 20 MG/1
20 TABLET ORAL
Qty: 90 TABLET | Refills: 0 | Status: SHIPPED | OUTPATIENT
Start: 2024-09-19

## 2024-09-19 RX ORDER — MELOXICAM 7.5 MG/1
7.5 TABLET ORAL
Qty: 90 TABLET | Refills: 0 | Status: SHIPPED | OUTPATIENT
Start: 2024-09-19

## 2024-09-19 NOTE — TELEPHONE ENCOUNTER
Refill Routing Note   Medication(s) are not appropriate for processing by [facility] for the following reason(s):        Outside of protocol    [facility] action(s):  Approve  Route   Requires labs : Yes             Appointments  past 12m or future 3m with PCP    Date Provider   Last Visit   7/19/2024 Roge Gayle MD   Next Visit   2/20/2025 Roge Gayle MD   ED visits in past 90 days: 0        Note composed:6:34 PM 09/19/2024

## 2024-09-19 NOTE — TELEPHONE ENCOUNTER
Care Due:                  Date            Visit Type   Department     Provider  --------------------------------------------------------------------------------                                EP -                              PRIMARY      [facility]  Last Visit: 07-      CARE ([facility])   MEDICINE       Roge Gayle                               -                              PRIMARY      [facility]  Next Visit: 02-      CARE ([facility])   MEDICINE       Roge Gayle                                                            Last  Test          Frequency    Reason                     Performed    Due Date  --------------------------------------------------------------------------------    CBC.........  12 months..  meloxicam................  11-   10-    CMP.........  12 months..  atorvastatin,              11-   10-                             hydroCHLOROthiazide,                             lisinopriL, meloxicam,                             metFORMIN................    HBA1C.......  6 months...  metFORMIN, tirzepatide...  06- 12-    Lipid Panel.  12 months..  atorvastatin.............  11-   10-    Health [facility] Embedded Care Due Messages. Reference number: [id number].   9/19/2024 7:02:55 AM CDT

## 2024-10-05 NOTE — TELEPHONE ENCOUNTER
No care due was identified.  [facility] Embedded Care Due Messages. Reference number: [id number].   10/05/2024 7:03:05 AM CDT

## 2024-10-06 RX ORDER — HYDROCHLOROTHIAZIDE 12.5 MG/1
12.5 CAPSULE ORAL
Qty: 90 CAPSULE | Refills: 0 | Status: SHIPPED | OUTPATIENT
Start: 2024-10-06

## 2024-10-06 NOTE — TELEPHONE ENCOUNTER
Refill Decision Note   Amanda Colbert  is requesting a refill authorization.  Brief Assessment and Rationale for Refill:  Approve     Medication Therapy Plan:        Comments:     Note composed:1:28 AM 10/06/2024

## 2024-10-18 RX ORDER — METFORMIN HYDROCHLORIDE 500 MG/1
500 TABLET ORAL 2 TIMES DAILY WITH MEALS
Qty: 180 TABLET | Refills: 0 | Status: SHIPPED | OUTPATIENT
Start: 2024-10-18

## 2024-10-18 NOTE — TELEPHONE ENCOUNTER
Refill Decision Note   Amanda Colbert  is requesting a refill authorization.  Brief Assessment and Rationale for Refill:  Approve     Medication Therapy Plan:        Comments:     Note composed:6:20 PM 10/18/2024

## 2024-10-18 NOTE — TELEPHONE ENCOUNTER
No care due was identified.  Health [facility] Embedded Care Due Messages. Reference number: [id number].   10/18/2024 7:04:08 AM CDT

## 2024-11-14 RX ORDER — METOPROLOL TARTRATE 100 MG/1
50 TABLET ORAL 2 TIMES DAILY
Qty: 90 TABLET | Refills: 2 | Status: SHIPPED | OUTPATIENT
Start: 2024-11-14

## 2024-11-14 NOTE — TELEPHONE ENCOUNTER
Refill Decision Note   Amanda Colbert  is requesting a refill authorization.  Brief Assessment and Rationale for Refill:  Approve     Medication Therapy Plan:         Comments:     Note composed:12:18 PM 11/14/2024

## 2024-11-14 NOTE — TELEPHONE ENCOUNTER
No care due was identified.  Health [facility] Embedded Care Due Messages. Reference number: [id number].   11/14/2024 10:48:47 AM CST

## 2024-11-16 ENCOUNTER — HOSPITAL ENCOUNTER (OUTPATIENT)
Facility: HOSPITAL | Age: 54
Discharge: HOME | End: 2024-11-18
Attending: STUDENT IN AN ORGANIZED HEALTH CARE EDUCATION/TRAINING PROGRAM | Admitting: EMERGENCY MEDICINE
Payer: COMMERCIAL

## 2024-11-16 DIAGNOSIS — R18.8 OTHER ASCITES: Primary | ICD-10-CM

## 2024-11-16 DIAGNOSIS — R93.2 ABNORMAL COMPUTED TOMOGRAPHY OF GALLBLADDER: ICD-10-CM

## 2024-11-16 DIAGNOSIS — R10.9 ABDOMINAL PAIN: ICD-10-CM

## 2024-11-16 DIAGNOSIS — R07.9 CHEST PAIN: ICD-10-CM

## 2024-11-16 DIAGNOSIS — R11.2 NAUSEA AND VOMITING, UNSPECIFIED VOMITING TYPE: ICD-10-CM

## 2024-11-16 DIAGNOSIS — K80.20 GALLSTONES: ICD-10-CM

## 2024-11-16 LAB
ALBUMIN SERPL BCP-MCNC: 4.4 G/DL (ref 3.5–5.2)
ALP SERPL-CCNC: 69 U/L (ref 38–126)
ALT SERPL W/O P-5'-P-CCNC: 33 U/L (ref 10–44)
ANION GAP SERPL CALC-SCNC: 10 MMOL/L (ref 8–16)
AST SERPL-CCNC: 29 U/L (ref 15–46)
BASOPHILS # BLD AUTO: 0.04 K/UL (ref 0–0.2)
BASOPHILS NFR BLD: 0.3 % (ref 0–1.9)
BILIRUB SERPL-MCNC: 0.7 MG/DL (ref 0.1–1)
CALCIUM SERPL-MCNC: 8.6 MG/DL (ref 8.7–10.5)
CHLORIDE SERPL-SCNC: 99 MMOL/L (ref 95–110)
CO2 SERPL-SCNC: 27 MMOL/L (ref 23–29)
CREAT SERPL-MCNC: 1.24 MG/DL (ref 0.5–1.4)
CRP SERPL-MCNC: 1.48 MG/DL (ref 0–1)
DIFFERENTIAL METHOD BLD: ABNORMAL
EOSINOPHIL # BLD AUTO: 0 K/UL (ref 0–0.5)
EOSINOPHIL NFR BLD: 0.3 % (ref 0–8)
ERYTHROCYTE [DISTWIDTH] IN BLOOD BY AUTOMATED COUNT: 13.7 % (ref 11.5–14.5)
EST. GFR  (NO RACE VARIABLE): 51.4 ML/MIN/1.73 M^2
GLUCOSE SERPL-MCNC: 150 MG/DL (ref 70–110)
HCT VFR BLD AUTO: 38.3 % (ref 37–48.5)
HGB BLD-MCNC: 12.9 G/DL (ref 12–16)
IMM GRANULOCYTES # BLD AUTO: 0.08 K/UL (ref 0–0.04)
IMM GRANULOCYTES NFR BLD AUTO: 0.5 % (ref 0–0.5)
LIPASE SERPL-CCNC: 59 U/L (ref 23–300)
LYMPHOCYTES # BLD AUTO: 1.8 K/UL (ref 1–4.8)
LYMPHOCYTES NFR BLD: 12 % (ref 18–48)
MCH RBC QN AUTO: 33 PG (ref 27–31)
MCHC RBC AUTO-ENTMCNC: 33.7 G/DL (ref 32–36)
MCV RBC AUTO: 98 FL (ref 82–98)
MONOCYTES # BLD AUTO: 0.5 K/UL (ref 0.3–1)
MONOCYTES NFR BLD: 3.3 % (ref 4–15)
NEUTROPHILS # BLD AUTO: 12.4 K/UL (ref 1.8–7.7)
NEUTROPHILS NFR BLD: 83.6 % (ref 38–73)
NRBC BLD-RTO: 0 /100 WBC
PLATELET # BLD AUTO: 296 K/UL (ref 150–450)
PMV BLD AUTO: 9.7 FL (ref 9.2–12.9)
POTASSIUM SERPL-SCNC: 4.2 MMOL/L (ref 3.5–5.1)
PROT SERPL-MCNC: 7.6 G/DL (ref 6–8.4)
RBC # BLD AUTO: 3.91 M/UL (ref 4–5.4)
SODIUM SERPL-SCNC: 136 MMOL/L (ref 136–145)
UUN UR-MCNC: 16 MG/DL (ref 7–17)
WBC # BLD AUTO: 14.82 K/UL (ref 3.9–12.7)

## 2024-11-16 PROCEDURE — 83690 ASSAY OF LIPASE: CPT | Mod: ER | Performed by: STUDENT IN AN ORGANIZED HEALTH CARE EDUCATION/TRAINING PROGRAM

## 2024-11-16 PROCEDURE — 25500020 PHARM REV CODE 255: Mod: ER | Performed by: STUDENT IN AN ORGANIZED HEALTH CARE EDUCATION/TRAINING PROGRAM

## 2024-11-16 PROCEDURE — 86140 C-REACTIVE PROTEIN: CPT | Mod: ER | Performed by: STUDENT IN AN ORGANIZED HEALTH CARE EDUCATION/TRAINING PROGRAM

## 2024-11-16 PROCEDURE — 96375 TX/PRO/DX INJ NEW DRUG ADDON: CPT

## 2024-11-16 PROCEDURE — 99285 EMERGENCY DEPT VISIT HI MDM: CPT | Mod: 25

## 2024-11-16 PROCEDURE — 83605 ASSAY OF LACTIC ACID: CPT | Mod: ER | Performed by: STUDENT IN AN ORGANIZED HEALTH CARE EDUCATION/TRAINING PROGRAM

## 2024-11-16 PROCEDURE — 81003 URINALYSIS AUTO W/O SCOPE: CPT | Mod: ER | Performed by: STUDENT IN AN ORGANIZED HEALTH CARE EDUCATION/TRAINING PROGRAM

## 2024-11-16 PROCEDURE — 85025 COMPLETE CBC W/AUTO DIFF WBC: CPT | Mod: ER | Performed by: STUDENT IN AN ORGANIZED HEALTH CARE EDUCATION/TRAINING PROGRAM

## 2024-11-16 PROCEDURE — 80053 COMPREHEN METABOLIC PANEL: CPT | Mod: ER | Performed by: STUDENT IN AN ORGANIZED HEALTH CARE EDUCATION/TRAINING PROGRAM

## 2024-11-16 PROCEDURE — 63600175 PHARM REV CODE 636 W HCPCS: Mod: ER | Performed by: STUDENT IN AN ORGANIZED HEALTH CARE EDUCATION/TRAINING PROGRAM

## 2024-11-16 RX ORDER — MORPHINE SULFATE 4 MG/ML
8 INJECTION, SOLUTION INTRAMUSCULAR; INTRAVENOUS
Status: COMPLETED | OUTPATIENT
Start: 2024-11-16 | End: 2024-11-16

## 2024-11-16 RX ORDER — ONDANSETRON HYDROCHLORIDE 2 MG/ML
8 INJECTION, SOLUTION INTRAVENOUS ONCE
Status: COMPLETED | OUTPATIENT
Start: 2024-11-16 | End: 2024-11-16

## 2024-11-16 RX ADMIN — ONDANSETRON 8 MG: 2 INJECTION INTRAMUSCULAR; INTRAVENOUS at 10:11

## 2024-11-16 RX ADMIN — IOHEXOL 100 ML: 350 INJECTION, SOLUTION INTRAVENOUS at 11:11

## 2024-11-16 RX ADMIN — MORPHINE SULFATE 8 MG: 4 INJECTION INTRAVENOUS at 10:11

## 2024-11-17 PROBLEM — R10.9 ABDOMINAL PAIN: Status: ACTIVE | Noted: 2024-11-17

## 2024-11-17 PROBLEM — D72.829 LEUKOCYTOSIS: Status: ACTIVE | Noted: 2024-11-17

## 2024-11-17 PROBLEM — K80.20 CHOLELITHIASIS: Status: ACTIVE | Noted: 2024-11-17

## 2024-11-17 LAB
BILIRUB UR QL STRIP: NEGATIVE
CLARITY UR REFRACT.AUTO: CLEAR
COLOR UR AUTO: YELLOW
ESTIMATED AVG GLUCOSE: 114 MG/DL (ref 68–131)
GLUCOSE UR QL STRIP: NEGATIVE
HBA1C MFR BLD: 5.6 % (ref 4–5.6)
HGB UR QL STRIP: NEGATIVE
KETONES UR QL STRIP: ABNORMAL
LACTATE SERPL-SCNC: 1 MMOL/L (ref 0.5–2.2)
LEUKOCYTE ESTERASE UR QL STRIP: NEGATIVE
NITRITE UR QL STRIP: NEGATIVE
PH UR STRIP: 5 [PH] (ref 5–8)
POCT GLUCOSE: 137 MG/DL (ref 70–110)
POCT GLUCOSE: 92 MG/DL (ref 70–110)
PROT UR QL STRIP: NEGATIVE
SP GR UR STRIP: 1.01 (ref 1–1.03)
URN SPEC COLLECT METH UR: ABNORMAL
UROBILINOGEN UR STRIP-ACNC: NEGATIVE EU/DL

## 2024-11-17 PROCEDURE — 25000003 PHARM REV CODE 250: Mod: ER | Performed by: STUDENT IN AN ORGANIZED HEALTH CARE EDUCATION/TRAINING PROGRAM

## 2024-11-17 PROCEDURE — 96366 THER/PROPH/DIAG IV INF ADDON: CPT

## 2024-11-17 PROCEDURE — 99203 OFFICE O/P NEW LOW 30 MIN: CPT | Mod: ,,, | Performed by: SURGERY

## 2024-11-17 PROCEDURE — 96361 HYDRATE IV INFUSION ADD-ON: CPT

## 2024-11-17 PROCEDURE — 63600175 PHARM REV CODE 636 W HCPCS: Performed by: REGISTERED NURSE

## 2024-11-17 PROCEDURE — G0378 HOSPITAL OBSERVATION PER HR: HCPCS | Mod: ER

## 2024-11-17 PROCEDURE — G0378 HOSPITAL OBSERVATION PER HR: HCPCS

## 2024-11-17 PROCEDURE — 25000003 PHARM REV CODE 250: Performed by: REGISTERED NURSE

## 2024-11-17 PROCEDURE — 63600175 PHARM REV CODE 636 W HCPCS: Mod: ER | Performed by: STUDENT IN AN ORGANIZED HEALTH CARE EDUCATION/TRAINING PROGRAM

## 2024-11-17 PROCEDURE — 87040 BLOOD CULTURE FOR BACTERIA: CPT | Mod: ER | Performed by: STUDENT IN AN ORGANIZED HEALTH CARE EDUCATION/TRAINING PROGRAM

## 2024-11-17 PROCEDURE — 96365 THER/PROPH/DIAG IV INF INIT: CPT

## 2024-11-17 PROCEDURE — 83036 HEMOGLOBIN GLYCOSYLATED A1C: CPT | Performed by: REGISTERED NURSE

## 2024-11-17 PROCEDURE — 82962 GLUCOSE BLOOD TEST: CPT

## 2024-11-17 PROCEDURE — 96375 TX/PRO/DX INJ NEW DRUG ADDON: CPT

## 2024-11-17 RX ORDER — HYDROMORPHONE HYDROCHLORIDE 1 MG/ML
0.5 INJECTION, SOLUTION INTRAMUSCULAR; INTRAVENOUS; SUBCUTANEOUS EVERY 6 HOURS PRN
Status: DISCONTINUED | OUTPATIENT
Start: 2024-11-17 | End: 2024-11-17

## 2024-11-17 RX ORDER — INSULIN ASPART 100 [IU]/ML
0-5 INJECTION, SOLUTION INTRAVENOUS; SUBCUTANEOUS EVERY 6 HOURS PRN
Status: DISCONTINUED | OUTPATIENT
Start: 2024-11-17 | End: 2024-11-18 | Stop reason: HOSPADM

## 2024-11-17 RX ORDER — ONDANSETRON HYDROCHLORIDE 2 MG/ML
4 INJECTION, SOLUTION INTRAVENOUS EVERY 8 HOURS PRN
Status: DISCONTINUED | OUTPATIENT
Start: 2024-11-17 | End: 2024-11-18 | Stop reason: HOSPADM

## 2024-11-17 RX ORDER — IBUPROFEN 200 MG
24 TABLET ORAL
Status: DISCONTINUED | OUTPATIENT
Start: 2024-11-17 | End: 2024-11-18 | Stop reason: HOSPADM

## 2024-11-17 RX ORDER — SODIUM CHLORIDE, SODIUM LACTATE, POTASSIUM CHLORIDE, CALCIUM CHLORIDE 600; 310; 30; 20 MG/100ML; MG/100ML; MG/100ML; MG/100ML
INJECTION, SOLUTION INTRAVENOUS CONTINUOUS
Status: ACTIVE | OUTPATIENT
Start: 2024-11-17 | End: 2024-11-17

## 2024-11-17 RX ORDER — HYDROMORPHONE HYDROCHLORIDE 1 MG/ML
1 INJECTION, SOLUTION INTRAMUSCULAR; INTRAVENOUS; SUBCUTANEOUS EVERY 6 HOURS PRN
Status: DISCONTINUED | OUTPATIENT
Start: 2024-11-17 | End: 2024-11-18 | Stop reason: HOSPADM

## 2024-11-17 RX ORDER — SODIUM CHLORIDE 0.9 % (FLUSH) 0.9 %
10 SYRINGE (ML) INJECTION EVERY 12 HOURS PRN
Status: DISCONTINUED | OUTPATIENT
Start: 2024-11-17 | End: 2024-11-18 | Stop reason: HOSPADM

## 2024-11-17 RX ORDER — NALOXONE HCL 0.4 MG/ML
0.02 VIAL (ML) INJECTION
Status: DISCONTINUED | OUTPATIENT
Start: 2024-11-17 | End: 2024-11-18 | Stop reason: HOSPADM

## 2024-11-17 RX ORDER — GLUCAGON 1 MG
1 KIT INJECTION
Status: DISCONTINUED | OUTPATIENT
Start: 2024-11-17 | End: 2024-11-18 | Stop reason: HOSPADM

## 2024-11-17 RX ORDER — IBUPROFEN 200 MG
16 TABLET ORAL
Status: DISCONTINUED | OUTPATIENT
Start: 2024-11-17 | End: 2024-11-18 | Stop reason: HOSPADM

## 2024-11-17 RX ADMIN — PIPERACILLIN AND TAZOBACTAM 4.5 G: 4; .5 INJECTION, POWDER, LYOPHILIZED, FOR SOLUTION INTRAVENOUS; PARENTERAL at 01:11

## 2024-11-17 RX ADMIN — SODIUM CHLORIDE, POTASSIUM CHLORIDE, SODIUM LACTATE AND CALCIUM CHLORIDE: 600; 310; 30; 20 INJECTION, SOLUTION INTRAVENOUS at 05:11

## 2024-11-17 RX ADMIN — SODIUM CHLORIDE 2000 ML: 9 INJECTION, SOLUTION INTRAVENOUS at 12:11

## 2024-11-17 RX ADMIN — HYDROMORPHONE HYDROCHLORIDE 1 MG: 1 INJECTION, SOLUTION INTRAMUSCULAR; INTRAVENOUS; SUBCUTANEOUS at 06:11

## 2024-11-17 RX ADMIN — PIPERACILLIN SODIUM AND TAZOBACTAM SODIUM 4.5 G: 4; .5 INJECTION, POWDER, LYOPHILIZED, FOR SOLUTION INTRAVENOUS at 11:11

## 2024-11-17 RX ADMIN — SODIUM CHLORIDE, POTASSIUM CHLORIDE, SODIUM LACTATE AND CALCIUM CHLORIDE: 600; 310; 30; 20 INJECTION, SOLUTION INTRAVENOUS at 03:11

## 2024-11-17 RX ADMIN — PIPERACILLIN SODIUM AND TAZOBACTAM SODIUM 4.5 G: 4; .5 INJECTION, POWDER, LYOPHILIZED, FOR SOLUTION INTRAVENOUS at 08:11

## 2024-11-17 NOTE — ED NOTES
Per patient, 3/10 is tolerable pain, will inform writer if wanting analgesia or if pain worsens. Call bell in reach, pillow with pillowcase provided per request, side rails up x2. No other voiced concerns when asked at this time.

## 2024-11-17 NOTE — ED NOTES
Introduced self to patient, patient inquiring about ice chips, states has remained NPO since 2300, has not checked blood sugar recently; is Type 2 diabetic. No other voiced concerns when asked at this time, call bell in reach, side rails up x2. Lights dimmed per request.

## 2024-11-17 NOTE — SUBJECTIVE & OBJECTIVE
No current facility-administered medications on file prior to encounter.     Current Outpatient Medications on File Prior to Encounter   Medication Sig    atorvastatin (LIPITOR) 20 MG tablet Take 1 tablet by mouth once daily    blood sugar diagnostic (TRUETEST TEST STRIPS) Strp TEST BLOOD SUGAR LEVEL DAILY    hydroCHLOROthiazide (MICROZIDE) 12.5 mg capsule Take 1 capsule by mouth once daily    lisinopriL (PRINIVIL,ZESTRIL) 20 MG tablet Take 1 tablet by mouth once daily    metFORMIN (GLUCOPHAGE) 500 MG tablet TAKE 1 TABLET BY MOUTH TWICE DAILY WITH MEALS    metoprolol tartrate (LOPRESSOR) 100 MG tablet Take 0.5 tablets (50 mg total) by mouth 2 (two) times daily.    tirzepatide 7.5 mg/0.5 mL PnIj Inject 7.5 mg into the skin every 7 days.    amitriptyline (ELAVIL) 10 MG tablet One or two nightly for insomnia    meloxicam (MOBIC) 7.5 MG tablet Take 1 tablet by mouth once daily       Review of patient's allergies indicates:  No Known Allergies    Past Medical History:   Diagnosis Date    Hypertension     Type 2 diabetes mellitus without complications      Past Surgical History:   Procedure Laterality Date     SECTION      TUBAL LIGATION  10/08/2002     Family History       Problem Relation (Age of Onset)    Diabetes Mother, Brother          Tobacco Use    Smoking status: Never     Passive exposure: Never    Smokeless tobacco: Never   Substance and Sexual Activity    Alcohol use: Yes     Comment: occasionally    Drug use: Never    Sexual activity: Yes     Partners: Male     Birth control/protection: None     Review of Systems   Constitutional:  Negative for chills and fever.   Respiratory:  Negative for shortness of breath.    Cardiovascular:  Negative for chest pain and palpitations.     Objective:     Vital Signs (Most Recent):  Temp: 98.2 °F (36.8 °C) (24)  Pulse: 86 (24)  Resp: 16 (24)  BP: (!) 112/56 (24)  SpO2: 98 % (24) Vital Signs (24h Range):  Temp:   [98.2 °F (36.8 °C)-98.9 °F (37.2 °C)] 98.2 °F (36.8 °C)  Pulse:  [] 86  Resp:  [12-20] 16  SpO2:  [95 %-99 %] 98 %  BP: (112-167)/(56-93) 112/56     Weight: 116.1 kg (256 lb)  Body mass index is 41.32 kg/m².     Physical Exam  Constitutional:       General: She is not in acute distress.     Appearance: Normal appearance. She is obese.   HENT:      Head: Normocephalic and atraumatic.   Eyes:      General: No scleral icterus.     Extraocular Movements: Extraocular movements intact.   Cardiovascular:      Rate and Rhythm: Normal rate.   Pulmonary:      Effort: Pulmonary effort is normal. No respiratory distress.   Abdominal:      General: Abdomen is flat. There is no distension.      Palpations: Abdomen is soft.      Comments: Minimal tenderness on palpation or jhonatan-iumbilical/epigastric regions   Musculoskeletal:         General: Normal range of motion.   Skin:     General: Skin is warm and dry.      Coloration: Skin is not jaundiced.   Neurological:      General: No focal deficit present.      Mental Status: She is alert and oriented to person, place, and time.   Psychiatric:         Mood and Affect: Mood normal.         Behavior: Behavior normal.         Thought Content: Thought content normal.            I have reviewed all pertinent lab results within the past 24 hours.  CBC:   Recent Labs   Lab 11/16/24 2218   WBC 14.82*   RBC 3.91*   HGB 12.9   HCT 38.3      MCV 98   MCH 33.0*   MCHC 33.7     CMP:   Recent Labs   Lab 11/16/24  2218   *   CALCIUM 8.6*   ALBUMIN 4.4   PROT 7.6      K 4.2   CO2 27   CL 99   BUN 16   CREATININE 1.24   ALKPHOS 69   ALT 33   AST 29   BILITOT 0.7       Significant Diagnostics:  I have reviewed all pertinent imaging results/findings within the past 24 hours.

## 2024-11-17 NOTE — ASSESSMENT & PLAN NOTE
WBC 14.8, tachycardic,  Lactic acid normal  Given 30 cc/kg IV fluid bolus  Started on antibiotics    Source likely intra-abdominal  CT showing enteritis versus cholecystitis  Ultrasound without evidence of cholecystitis  General surgery consult

## 2024-11-17 NOTE — ASSESSMENT & PLAN NOTE
Patient's FSGs are controlled on current medication regimen.  Last A1c reviewed-   Lab Results   Component Value Date    HGBA1C 6.2 (H) 06/19/2024     Most recent fingerstick glucose reviewed-   Recent Labs   Lab 11/17/24  0445   POCTGLUCOSE 137*     Current correctional scale  Low  Maintain anti-hyperglycemic dose as follows-   Antihyperglycemics (From admission, onward)      Start     Stop Route Frequency Ordered    11/17/24 0807  insulin aspart U-100 pen 0-5 Units         -- SubQ Every 6 hours PRN 11/17/24 0708          Hold Oral hypoglycemics while patient is in the hospital.

## 2024-11-17 NOTE — ASSESSMENT & PLAN NOTE
Patient's FSGs are controlled on current medication regimen.  Last A1c reviewed-   Lab Results   Component Value Date    HGBA1C 5.6 11/17/2024     Most recent fingerstick glucose reviewed-   Recent Labs   Lab 11/17/24  0445   POCTGLUCOSE 137*       Current correctional scale  Low  Maintain anti-hyperglycemic dose as follows-   Antihyperglycemics (From admission, onward)    Start     Stop Route Frequency Ordered    11/17/24 0807  insulin aspart U-100 pen 0-5 Units         -- SubQ Every 6 hours PRN 11/17/24 0708        Hold Oral hypoglycemics while patient is in the hospital.

## 2024-11-17 NOTE — SUBJECTIVE & OBJECTIVE
Past Medical History:   Diagnosis Date    Hypertension     Type 2 diabetes mellitus without complications        Past Surgical History:   Procedure Laterality Date     SECTION      TUBAL LIGATION  10/08/2002       Review of patient's allergies indicates:  No Known Allergies    No current facility-administered medications on file prior to encounter.     Current Outpatient Medications on File Prior to Encounter   Medication Sig    atorvastatin (LIPITOR) 20 MG tablet Take 1 tablet by mouth once daily    blood sugar diagnostic (TRUETEST TEST STRIPS) Strp TEST BLOOD SUGAR LEVEL DAILY    hydroCHLOROthiazide (MICROZIDE) 12.5 mg capsule Take 1 capsule by mouth once daily    lisinopriL (PRINIVIL,ZESTRIL) 20 MG tablet Take 1 tablet by mouth once daily    metFORMIN (GLUCOPHAGE) 500 MG tablet TAKE 1 TABLET BY MOUTH TWICE DAILY WITH MEALS    metoprolol tartrate (LOPRESSOR) 100 MG tablet Take 0.5 tablets (50 mg total) by mouth 2 (two) times daily.    tirzepatide 7.5 mg/0.5 mL PnIj Inject 7.5 mg into the skin every 7 days.    amitriptyline (ELAVIL) 10 MG tablet One or two nightly for insomnia    meloxicam (MOBIC) 7.5 MG tablet Take 1 tablet by mouth once daily     Family History       Problem Relation (Age of Onset)    Diabetes Mother, Brother          Tobacco Use    Smoking status: Never     Passive exposure: Never    Smokeless tobacco: Never   Substance and Sexual Activity    Alcohol use: Yes     Comment: occasionally    Drug use: Never    Sexual activity: Yes     Partners: Male     Birth control/protection: None     Review of Systems   Gastrointestinal:  Positive for abdominal distention and abdominal pain.   All other systems reviewed and are negative.    Objective:     Vital Signs (Most Recent):  Temp: 98.4 °F (36.9 °C) (248)  Pulse: 99 (24 06)  Resp: 16 (24 0631)  BP: 122/66 (24)  SpO2: 98 % (24) Vital Signs (24h Range):  Temp:  [98.4 °F (36.9 °C)-98.9 °F (37.2 °C)] 98.4  °F (36.9 °C)  Pulse:  [] 99  Resp:  [12-20] 16  SpO2:  [95 %-99 %] 98 %  BP: (122-167)/(66-93) 122/66     Weight: 116.1 kg (256 lb)  Body mass index is 41.32 kg/m².     Physical Exam  Vitals reviewed.   Constitutional:       Appearance: She is obese.   HENT:      Head: Normocephalic.      Mouth/Throat:      Mouth: Mucous membranes are dry.   Eyes:      Pupils: Pupils are equal, round, and reactive to light.   Cardiovascular:      Rate and Rhythm: Normal rate and regular rhythm.      Pulses: Normal pulses.      Heart sounds: Normal heart sounds.   Pulmonary:      Effort: Pulmonary effort is normal.      Breath sounds: Normal breath sounds.   Abdominal:      General: Bowel sounds are normal.      Palpations: Abdomen is soft.   Musculoskeletal:         General: Normal range of motion.      Cervical back: Normal range of motion.   Skin:     General: Skin is warm and dry.      Capillary Refill: Capillary refill takes less than 2 seconds.   Neurological:      Mental Status: She is alert and oriented to person, place, and time. Mental status is at baseline.   Psychiatric:         Mood and Affect: Mood normal.         Behavior: Behavior normal.              CRANIAL NERVES     CN III, IV, VI   Pupils are equal, round, and reactive to light.       Significant Labs: All pertinent labs within the past 24 hours have been reviewed.  Recent Lab Results         11/17/24  0445   11/16/24  2353   11/16/24  2342   11/16/24  2218        Lipase Result       59       Albumin       4.4       ALP       69       ALT       33       Anion Gap       10       Appearance, UA     Clear         AST       29       Baso #       0.04       Basophil %       0.3       Bilirubin (UA)     Negative         BILIRUBIN TOTAL       0.7  Comment: For infants and newborns, interpretation of results should be based  on gestational age, weight and in agreement with clinical  observations.    Premature Infant recommended reference ranges:  Up to 24  hours.............<8.0 mg/dL  Up to 48 hours............<12.0 mg/dL  3-5 days..................<15.0 mg/dL  6-29 days.................<15.0 mg/dL         BUN       16       Calcium       8.6       Chloride       99       CO2       27       Color, UA     Yellow         Creatinine       1.24       CRP       1.48       Differential Method       Automated       eGFR       51.4       Eos #       0.0       Eos %       0.3       Glucose       150       Glucose, UA     Negative         Gran # (ANC)       12.4       Gran %       83.6       Hematocrit       38.3       Hemoglobin       12.9       Immature Grans (Abs)       0.08  Comment: Mild elevation in immature granulocytes is non specific and   can be seen in a variety of conditions including stress response,   acute inflammation, trauma and pregnancy. Correlation with other   laboratory and clinical findings is essential.         Immature Granulocytes       0.5       Ketones, UA     1+         Lactic Acid Level   1.0           Leukocyte Esterase, UA     Negative         Lymph #       1.8       Lymph %       12.0       MCH       33.0       MCHC       33.7       MCV       98       Mono #       0.5       Mono %       3.3       MPV       9.7       NITRITE UA     Negative         nRBC       0       Blood, UA     Negative         pH, UA     5.0         Platelet Count       296       POCT Glucose 137             Potassium       4.2       PROTEIN TOTAL       7.6       Protein, UA     Negative  Comment: Recommend a 24 hour urine protein or a urine   protein/creatinine ratio if globulin induced proteinuria is  clinically suspected.           RBC       3.91       RDW       13.7       Sodium       136       Spec Grav UA     1.015         Specimen UA     Urine, Clean Catch         UROBILINOGEN UA     Negative         WBC       14.82               Significant Imaging: I have reviewed all pertinent imaging results/findings within the past 24 hours.  I have reviewed and interpreted all  pertinent imaging results/findings within the past 24 hours.

## 2024-11-17 NOTE — ASSESSMENT & PLAN NOTE
Abdominal pain x1 day  Associated nausea and vomiting  LFTs normal  CT scan shows intra-abdominal free fluid, some distended loops of bowel, and some pericholecystic/perihepatic free fluid, as well as which collectively in the setting is equivocal for enteritis versus acute cholecystitis  Ultrasound shows cholelithiasis without evidence of acute cholecystitis    NPO-clear liquid diet-advance  General surgery consult-appreciate recs-no surgical intervention indicated.  No need for antibiotics  IV fluids    Continue Zosyn - deescalate

## 2024-11-17 NOTE — H&P
[place] - Emergency Dept  [facility]  History & Physical    Patient Name: Amanda Colbert  MRN: [id number]  Patient Class: OP- Observation  Admission Date: 2024  Attending Physician: Sherin Salazar MD   Primary Care Provider: Roge Gayle MD         Patient information was obtained from patient and ER records.     Subjective:     Principal Problem:<principal problem not specified>    Chief Complaint:   Chief Complaint   Patient presents with    Abdominal Pain    Vomiting     Pt complaining of abdominal pain, mid, upper quadrants, started at 1700. Pt reports vomiting.         HPI: Patient is a 53-year-old female who presented to ED at [facility] with 1 day of abdominal pain centrally located progressively worsening.  Also having an episode of nausea and vomiting, she denies any bloody stools.  In ED patient with  leukocytosis and tachycardia she was given 30 cc/kg IBW and broad-spectrum antibiotics appropriate for intra-abdominal processes were initiated, CT scan shows intra-abdominal free fluid, some distended loops of bowel, and some pericholecystic/perihepatic free fluid, as well as which collectively in the setting is equivocal for enteritis versus acute cholecystitis.  Case was discussed by ED MD and general surgeon who requested transferred to [facility].  Ultrasound was obtained, which showed cholelithiasis without evidence of acute cholecystitis.  Consider HIDA scan patient admitted to Hospital Medicine, we will continue on Zosyn,  keep NPO.  General surgery to evaluate the this a.m.    Past Medical History:   Diagnosis Date    Hypertension     Type 2 diabetes mellitus without complications        Past Surgical History:   Procedure Laterality Date     SECTION      TUBAL LIGATION  10/08/2002       Review of patient's allergies indicates:  No Known Allergies    No current facility-administered medications on file prior to encounter.     Current Outpatient Medications on  File Prior to Encounter   Medication Sig    atorvastatin (LIPITOR) 20 MG tablet Take 1 tablet by mouth once daily    blood sugar diagnostic (TRUETEST TEST STRIPS) Strp TEST BLOOD SUGAR LEVEL DAILY    hydroCHLOROthiazide (MICROZIDE) 12.5 mg capsule Take 1 capsule by mouth once daily    lisinopriL (PRINIVIL,ZESTRIL) 20 MG tablet Take 1 tablet by mouth once daily    metFORMIN (GLUCOPHAGE) 500 MG tablet TAKE 1 TABLET BY MOUTH TWICE DAILY WITH MEALS    metoprolol tartrate (LOPRESSOR) 100 MG tablet Take 0.5 tablets (50 mg total) by mouth 2 (two) times daily.    tirzepatide 7.5 mg/0.5 mL PnIj Inject 7.5 mg into the skin every 7 days.    amitriptyline (ELAVIL) 10 MG tablet One or two nightly for insomnia    meloxicam (MOBIC) 7.5 MG tablet Take 1 tablet by mouth once daily     Family History       Problem Relation (Age of Onset)    Diabetes Mother, Brother          Tobacco Use    Smoking status: Never     Passive exposure: Never    Smokeless tobacco: Never   Substance and Sexual Activity    Alcohol use: Yes     Comment: occasionally    Drug use: Never    Sexual activity: Yes     Partners: Male     Birth control/protection: None     Review of Systems   Gastrointestinal:  Positive for abdominal distention and abdominal pain.   All other systems reviewed and are negative.    Objective:     Vital Signs (Most Recent):  Temp: 98.4 °F (36.9 °C) (11/17/24 0448)  Pulse: 99 (11/17/24 0601)  Resp: 16 (11/17/24 0631)  BP: 122/66 (11/17/24 0601)  SpO2: 98 % (11/17/24 0601) Vital Signs (24h Range):  Temp:  [98.4 °F (36.9 °C)-98.9 °F (37.2 °C)] 98.4 °F (36.9 °C)  Pulse:  [] 99  Resp:  [12-20] 16  SpO2:  [95 %-99 %] 98 %  BP: (122-167)/(66-93) 122/66     Weight: 116.1 kg (256 lb)  Body mass index is 41.32 kg/m².     Physical Exam  Vitals reviewed.   Constitutional:       Appearance: She is obese.   HENT:      Head: Normocephalic.      Mouth/Throat:      Mouth: Mucous membranes are dry.   Eyes:      Pupils: Pupils are equal, round, and  reactive to light.   Cardiovascular:      Rate and Rhythm: Normal rate and regular rhythm.      Pulses: Normal pulses.      Heart sounds: Normal heart sounds.   Pulmonary:      Effort: Pulmonary effort is normal.      Breath sounds: Normal breath sounds.   Abdominal:      General: Bowel sounds are normal.      Palpations: Abdomen is soft.   Musculoskeletal:         General: Normal range of motion.      Cervical back: Normal range of motion.   Skin:     General: Skin is warm and dry.      Capillary Refill: Capillary refill takes less than 2 seconds.   Neurological:      Mental Status: She is alert and oriented to person, place, and time. Mental status is at baseline.   Psychiatric:         Mood and Affect: Mood normal.         Behavior: Behavior normal.              CRANIAL NERVES     CN III, IV, VI   Pupils are equal, round, and reactive to light.       Significant Labs: All pertinent labs within the past 24 hours have been reviewed.  Recent Lab Results         11/17/24  0445   11/16/24  2353   11/16/24  2342   11/16/24  2218        Lipase Result       59       Albumin       4.4       ALP       69       ALT       33       Anion Gap       10       Appearance, UA     Clear         AST       29       Baso #       0.04       Basophil %       0.3       Bilirubin (UA)     Negative         BILIRUBIN TOTAL       0.7  Comment: For infants and newborns, interpretation of results should be based  on gestational age, weight and in agreement with clinical  observations.    Premature Infant recommended reference ranges:  Up to 24 hours.............<8.0 mg/dL  Up to 48 hours............<12.0 mg/dL  3-5 days..................<15.0 mg/dL  6-29 days.................<15.0 mg/dL         BUN       16       Calcium       8.6       Chloride       99       CO2       27       Color, UA     Yellow         Creatinine       1.24       CRP       1.48       Differential Method       Automated       eGFR       51.4       Eos #       0.0       Eos  %       0.3       Glucose       150       Glucose, UA     Negative         Gran # (ANC)       12.4       Gran %       83.6       Hematocrit       38.3       Hemoglobin       12.9       Immature Grans (Abs)       0.08  Comment: Mild elevation in immature granulocytes is non specific and   can be seen in a variety of conditions including stress response,   acute inflammation, trauma and pregnancy. Correlation with other   laboratory and clinical findings is essential.         Immature Granulocytes       0.5       Ketones, UA     1+         Lactic Acid Level   1.0           Leukocyte Esterase, UA     Negative         Lymph #       1.8       Lymph %       12.0       MCH       33.0       MCHC       33.7       MCV       98       Mono #       0.5       Mono %       3.3       MPV       9.7       NITRITE UA     Negative         nRBC       0       Blood, UA     Negative         pH, UA     5.0         Platelet Count       296       POCT Glucose 137             Potassium       4.2       PROTEIN TOTAL       7.6       Protein, UA     Negative  Comment: Recommend a 24 hour urine protein or a urine   protein/creatinine ratio if globulin induced proteinuria is  clinically suspected.           RBC       3.91       RDW       13.7       Sodium       136       Spec Grav UA     1.015         Specimen UA     Urine, Clean Catch         UROBILINOGEN UA     Negative         WBC       14.82               Significant Imaging: I have reviewed all pertinent imaging results/findings within the past 24 hours.  I have reviewed and interpreted all pertinent imaging results/findings within the past 24 hours.  Assessment/Plan:     Leukocytosis  WBC 14.8, tachycardic,  Lactic acid normal  Given 30 cc/kg IV fluid bolus  Started on antibiotics    Source likely intra-abdominal  CT showing enteritis versus cholecystitis  Ultrasound without evidence of cholecystitis  General surgery consult        Abdominal pain  Abdominal pain x1 day  Associated nausea and  vomiting  LFTs normal  CT scan shows intra-abdominal free fluid, some distended loops of bowel, and some pericholecystic/perihepatic free fluid, as well as which collectively in the setting is equivocal for enteritis versus acute cholecystitis  Ultrasound shows cholelithiasis without evidence of acute cholecystitis    NPO  General surgery consult  IV fluids  Possible HIDA scan, unfortunately unable to obtain on Sundays.  Await surgery recs  Continue Zosyn    DM (diabetes mellitus)  Patient's FSGs are controlled on current medication regimen.  Last A1c reviewed-   Lab Results   Component Value Date    HGBA1C 6.2 (H) 06/19/2024     Most recent fingerstick glucose reviewed-   Recent Labs   Lab 11/17/24  0445   POCTGLUCOSE 137*     Current correctional scale  Low  Maintain anti-hyperglycemic dose as follows-   Antihyperglycemics (From admission, onward)      Start     Stop Route Frequency Ordered    11/17/24 0807  insulin aspart U-100 pen 0-5 Units         -- SubQ Every 6 hours PRN 11/17/24 0708          Hold Oral hypoglycemics while patient is in the hospital.      VTE Risk Mitigation (From admission, onward)           Ordered     Reason for No Pharmacological VTE Prophylaxis  Once        Question:  Reasons:  Answer:  Patient is Ambulatory    11/17/24 0449     IP VTE HIGH RISK PATIENT  Once         11/17/24 0449     Place sequential compression device  Until discontinued         11/17/24 0449                         On 11/17/2024, patient should be placed in hospital observation services under my care in collaboration with Dr Salazar.           Jeronimo Posey, NP  Department of Hospital Medicine  [facility] - Emergency Dept

## 2024-11-17 NOTE — ED PROVIDER NOTES
Encounter Date: 2024       History     Chief Complaint   Patient presents with    Abdominal Pain    Vomiting     Pt complaining of abdominal pain, mid, upper quadrants, started at 1700. Pt reports vomiting.      HPI  53-year-old female history of hypertension, hyperlipidemia, , no other pertinent medical history presents brought in by self through triage for abdominal pain and vomiting for 1 day.  Abdominal pain this predominantly upper and central.  Review of patient's allergies indicates:  No Known Allergies  Past Medical History:   Diagnosis Date    Hypertension     Type 2 diabetes mellitus without complications      Past Surgical History:   Procedure Laterality Date     SECTION      TUBAL LIGATION  10/08/2002     Family History   Problem Relation Name Age of Onset    Diabetes Mother Shanel     Diabetes Brother       Social History     Tobacco Use    Smoking status: Never     Passive exposure: Never    Smokeless tobacco: Never   Substance Use Topics    Alcohol use: Yes     Comment: occasionally    Drug use: Never   Medical surgical family and social history reviewed  Review of Systems  Complete review of systems was conducted and was negative except as per HPI and as marked  Physical Exam     Initial Vitals [24 2148]   BP Pulse Resp Temp SpO2   (!) 167/93 98 20 98.6 °F (37 °C) 99 %      MAP       --         Physical Exam  Physical  General: Awake, alert, no acute distress  Head: Normocephalic, atraumatic  Neck: Trachea midline, full range of motion, no meningismus  ENT: Atraumatic, Airway Patent slightly dry mucous membranes  Cardio:  Regular borderline tachycardia Heart Sounds Normal, Capillary refill normal  Chest: Atraumatic, No respiratory distress no use of accessory muscles to breath, normal rate, sounds even and clear to auscultation  Abdomen: Soft, upper abdominal tenderness that is both right-sided and left-sided without peritonitis,, no involuntary guarding, rigidity, or  rebound.  Upper Ext: Atraumatic, Inspection normal, no swelling  Lower Ext: Atraumatic, Inspection normal, no swelling  Neuro: No gross cranial nerve abnormality, no lateralization, no gross sensory or motor deficits  ED Course   Procedures  Labs Reviewed   CBC W/ AUTO DIFFERENTIAL - Abnormal       Result Value    WBC 14.82 (*)     RBC 3.91 (*)     Hemoglobin 12.9      Hematocrit 38.3      MCV 98      MCH 33.0 (*)     MCHC 33.7      RDW 13.7      Platelets 296      MPV 9.7      Immature Granulocytes 0.5      Gran # (ANC) 12.4 (*)     Immature Grans (Abs) 0.08 (*)     Lymph # 1.8      Mono # 0.5      Eos # 0.0      Baso # 0.04      nRBC 0      Gran % 83.6 (*)     Lymph % 12.0 (*)     Mono % 3.3 (*)     Eosinophil % 0.3      Basophil % 0.3      Differential Method Automated     COMPREHENSIVE METABOLIC PANEL - Abnormal    Sodium 136      Potassium 4.2      Chloride 99      CO2 27      Glucose 150 (*)     BUN 16      Creatinine 1.24      Calcium 8.6 (*)     Total Protein 7.6      Albumin 4.4      Total Bilirubin 0.7      Alkaline Phosphatase 69      AST 29      ALT 33      Anion Gap 10      eGFR 51.4 (*)    URINALYSIS, REFLEX TO URINE CULTURE - Abnormal    Specimen UA Urine, Clean Catch      Color, UA Yellow      Appearance, UA Clear      pH, UA 5.0      Specific Gravity, UA 1.015      Protein, UA Negative      Glucose, UA Negative      Ketones, UA 1+ (*)     Bilirubin (UA) Negative      Occult Blood UA Negative      Nitrite, UA Negative      Urobilinogen, UA Negative      Leukocytes, UA Negative      Narrative:     Preferred Collection Type->Urine, Clean Catch  Specimen Source->Urine   C-REACTIVE PROTEIN - Abnormal    CRP 1.48 (*)    CULTURE, BLOOD   CULTURE, BLOOD   LIPASE    Lipase Result 59     LACTIC ACID, PLASMA    Lactate (Lactic Acid) 1.0            Imaging Results              CT Abdomen Pelvis With IV Contrast NO Oral Contrast (Final result)  Result time 11/16/24 23:36:41      Final result by Lio Flores  MD SELENA (11/16/24 23:36:41)                   Impression:      Abnormally thickened loops of small bowel with associated mesenteric edema and free fluid throughout the abdomen and pelvis.  Findings are favored to reflect a nonspecific enteritis including infectious, inflammatory, or ischemic etiologies.  Clinical correlation is advised.    Mildly distended gallbladder with associated cholelithiasis.  Free fluid within the upper abdomen favored to relate to underlying small bowel etiology, although gallbladder pathology is not excluded.  Further evaluation with dedicated ultrasound as warranted.    Hepatomegaly with hepatic steatosis.    Colonic diverticulosis without evidence of acute diverticulitis.    Multiple calcified uterine lesions presumably reflecting fibroids.  There is nonspecific mildly complex fluid within the endometrial cavity.  Further evaluation and follow-up with pelvic ultrasound could be performed when clinically appropriate.    Additional findings as above.      Electronically signed by: Lio Flores MD  Date:    11/16/2024  Time:    23:36               Narrative:    EXAMINATION:  CT ABDOMEN PELVIS WITH IV CONTRAST    CLINICAL HISTORY:  Abdominal pain, acute, nonlocalized;    TECHNIQUE:  Low dose axial images, sagittal and coronal reformations were obtained from the lung bases to the pubic symphysis following the IV administration of 100 mL of Omnipaque 350 .  Oral contrast was not given.    COMPARISON:  None.    FINDINGS:  The lung bases are unremarkable.  There is no pleural fluid present.  The visualized portions of the heart appear normal.    The liver is enlarged.  There is diffuse decreased attenuation of the hepatic parenchyma suggesting hepatic steatosis.  Gallbladder is mildly distended with associated cholelithiasis.  There is no intra-or extrahepatic biliary ductal dilatation.    There is mild fluid-filled distention of the stomach.  The spleen, pancreas, and adrenal glands appear  within normal limits.    The kidneys normal in size and location and enhance symmetrically.  There is no evidence of hydronephrosis. Urinary bladder is relatively decompressed limiting assessment.  There are multiple calcified uterine masses presumably reflecting fibroids.  There is nonspecific mildly complex fluid within the endometrial cavity.    The abdominal aorta is normal in course and caliber with mild atherosclerotic calcification along its course.  There is no retroperitoneal hematoma.    There are abnormally thickened loops of small bowel throughout the abdomen and pelvis.  There is associated mesenteric edema interposed between the abnormally thickened loops of small bowel.  There is a small volume of free fluid also present throughout the abdomen and pelvis.  Findings are suggestive of a nonspecific enteritis.  There are scattered colonic diverticula without of acute diverticulitis.  There is no localized inflammatory change about the appendix to suggest acute appendicitis.  There is no free intraperitoneal air or portal venous gas.  There is a small fat containing umbilical hernia.  There is an additional abdominal ventral hernia containing mesenteric fat and ascites.  There are scattered shotty mesenteric and retroperitoneal lymph nodes.    Osseous structures demonstrate mild degenerative changes.  The extraperitoneal soft tissues are unremarkable.                                       Medications   sodium chloride 0.9% bolus 1,779 mL 1,779 mL (2,000 mLs Intravenous New Bag 11/17/24 0001)   piperacillin-tazobactam (ZOSYN) 4.5 g in D5W 100 mL IVPB (MB+) (has no administration in time range)   morphine injection 8 mg (8 mg Intravenous Given 11/16/24 2221)   ondansetron injection 8 mg (8 mg Intravenous Given 11/16/24 2221)   iohexoL (OMNIPAQUE 350) injection 100 mL (100 mLs Intravenous Given 11/16/24 2300)     Medical Decision Making                 53-year-old female presents for abdominal pain and  vomiting.  Hemodynamically stable in no acute distress but in obvious discomfort.  Will evaluate for acute pathology requiring intervention with appropriate studies, treat symptomatically, and reassess.    Studies reviewed, given leukocytosis and tachycardia 30 cc/kg IBW and broad-spectrum antibiotics appropriate for intra-abdominal processes were initiated, CT scan shows intra-abdominal free fluid, some distended loops of bowel, and some pericholecystic/perihepatic free fluid, as well as which collectively in the setting is equivocal for enteritis versus acute cholecystitis, it is not exactly clear at this point which 1 it is.  I discussed all this with the on-call general surgery team at [place], who likewise reviewed the images, who recommended patient be ultrasounded, and admitted to medicine service for further evaluation and treatments for other studies, possible HIDA versus possible observation, etcetera.  They requested she remain NPO.  Patient and family were informed and they agree.  All elements of care discussed with the admitting team will be continuing care.    Patient seen in the Emergency department, which included consideration and evaluation for life and limb threatening medical conditions including the history, exam, timely review and interpretation of studies.   All labs and imaging ordered for this encountered were reviewed and included in medical decision making.   Additional information for this case was obtained from discussion with:  Family present at bedside                 Clinical Impression:  Final diagnoses:  [R10.9] Abdominal pain  [R18.8] Other ascites (Primary)  [R11.2] Nausea and vomiting, unspecified vomiting type  [R93.2] Abnormal computed tomography of gallbladder  [K80.20] Gallstones          ED Disposition Condition    Observation Stable                Alberto Lomas MD  11/17/24 0030

## 2024-11-17 NOTE — ED NOTES
Patient given analgesic per request. No other voiced concerns when asked, no visible distress, breathing even and unlabored. Call bell in reach, side rails up x2.

## 2024-11-17 NOTE — ED NOTES
Assumed care of pt who came in reporting N/V and being unable to hold anything down along with diffuse bilat upper abd quad pain now 10/10 that started today at 1700. She denies any fever, diarrhea, SOB, or vag bleeding. She is holding the abd crying appearing to be in moderate pain. Upon assessment she is obese and the abd is rounded but does not appear distended. The abd is soft and tender to palpation BS hyper active

## 2024-11-17 NOTE — HPI
Amanda Colbert is a 53F with pmhx t2dm, htn, hld who presented to the ED with complaints of jhonatan-umbilical/epigastric abdominal pain and n/v for 1 day.  WBC and CRP mildly elevated. LFTs wnl. CT obtained which showed some edematous small bowel with surrounding fluid and perihepatic fluid concerning for enteritis. Cholelithiasis also noted with mildly distended gallbladder and General Surgery consulted for evaluation. On exam this morning patient says the pain has nearly completely resolved. Very minimal tenderness on deep palpation of periumbilical region.     Surgical History includes

## 2024-11-17 NOTE — ASSESSMENT & PLAN NOTE
WBC 14.8, tachycardic,  Lactic acid normal  Given 30 cc/kg IV fluid bolus  Started on antibiotics-discussed    Source likely intra-abdominal  CT showing enteritis versus cholecystitis  Ultrasound without evidence of cholecystitis  General surgery consult-no need for antibiotics

## 2024-11-17 NOTE — ED NOTES
Pt requesting water. Contacted primary team to see if NPO status is to remain in place. Pt notified awaiting response from MD.

## 2024-11-17 NOTE — CONSULTS
[facility] - Emergency Dept  General Surgery  Consult Note    Patient Name: Amanda Colbert  MRN: [id number]  Code Status: Full Code  Admission Date: 2024  Hospital Length of Stay: 0 days  Attending Physician: Kaylin Crespo*  Primary Care Provider: Roge Gayle MD    Patient information was obtained from patient and past medical records.     General Surgery  Consult performed by: Zoraida Zacarias MD  Consult ordered by: Jeronimo Posey NP        Subjective:     Principal Problem: Cholelithiasis    History of Present Illness: Amanda Colbert is a 53F with pmhx t2dm, htn, hld who presented to the ED with complaints of jhonatan-umbilical/epigastric abdominal pain and n/v for 1 day.  WBC and CRP mildly elevated. LFTs wnl. CT obtained which showed some edematous small bowel with surrounding fluid and perihepatic fluid concerning for enteritis. Cholelithiasis also noted with mildly distended gallbladder and General Surgery consulted for evaluation. On exam this morning patient says the pain has nearly completely resolved. Very minimal tenderness on deep palpation of periumbilical region.     Surgical History includes       No current facility-administered medications on file prior to encounter.     Current Outpatient Medications on File Prior to Encounter   Medication Sig    atorvastatin (LIPITOR) 20 MG tablet Take 1 tablet by mouth once daily    blood sugar diagnostic (TRUETEST TEST STRIPS) Strp TEST BLOOD SUGAR LEVEL DAILY    hydroCHLOROthiazide (MICROZIDE) 12.5 mg capsule Take 1 capsule by mouth once daily    lisinopriL (PRINIVIL,ZESTRIL) 20 MG tablet Take 1 tablet by mouth once daily    metFORMIN (GLUCOPHAGE) 500 MG tablet TAKE 1 TABLET BY MOUTH TWICE DAILY WITH MEALS    metoprolol tartrate (LOPRESSOR) 100 MG tablet Take 0.5 tablets (50 mg total) by mouth 2 (two) times daily.    tirzepatide 7.5 mg/0.5 mL PnIj Inject 7.5 mg into the skin every 7 days.    amitriptyline (ELAVIL) 10 MG tablet  One or two nightly for insomnia    meloxicam (MOBIC) 7.5 MG tablet Take 1 tablet by mouth once daily       Review of patient's allergies indicates:  No Known Allergies    Past Medical History:   Diagnosis Date    Hypertension     Type 2 diabetes mellitus without complications      Past Surgical History:   Procedure Laterality Date     SECTION      TUBAL LIGATION  10/08/2002     Family History       Problem Relation (Age of Onset)    Diabetes Mother, Brother          Tobacco Use    Smoking status: Never     Passive exposure: Never    Smokeless tobacco: Never   Substance and Sexual Activity    Alcohol use: Yes     Comment: occasionally    Drug use: Never    Sexual activity: Yes     Partners: Male     Birth control/protection: None     Review of Systems   Constitutional:  Negative for chills and fever.   Respiratory:  Negative for shortness of breath.    Cardiovascular:  Negative for chest pain and palpitations.     Objective:     Vital Signs (Most Recent):  Temp: 98.2 °F (36.8 °C) (24)  Pulse: 86 (24)  Resp: 16 (24)  BP: (!) 112/56 (24)  SpO2: 98 % (24) Vital Signs (24h Range):  Temp:  [98.2 °F (36.8 °C)-98.9 °F (37.2 °C)] 98.2 °F (36.8 °C)  Pulse:  [] 86  Resp:  [12-20] 16  SpO2:  [95 %-99 %] 98 %  BP: (112-167)/(56-93) 112/56     Weight: 116.1 kg (256 lb)  Body mass index is 41.32 kg/m².     Physical Exam  Constitutional:       General: She is not in acute distress.     Appearance: Normal appearance. She is obese.   HENT:      Head: Normocephalic and atraumatic.   Eyes:      General: No scleral icterus.     Extraocular Movements: Extraocular movements intact.   Cardiovascular:      Rate and Rhythm: Normal rate.   Pulmonary:      Effort: Pulmonary effort is normal. No respiratory distress.   Abdominal:      General: Abdomen is flat. There is no distension.      Palpations: Abdomen is soft.      Comments: Minimal tenderness on palpation or  jhonatan-iumbilical/epigastric regions   Musculoskeletal:         General: Normal range of motion.   Skin:     General: Skin is warm and dry.      Coloration: Skin is not jaundiced.   Neurological:      General: No focal deficit present.      Mental Status: She is alert and oriented to person, place, and time.   Psychiatric:         Mood and Affect: Mood normal.         Behavior: Behavior normal.         Thought Content: Thought content normal.            I have reviewed all pertinent lab results within the past 24 hours.  CBC:   Recent Labs   Lab 11/16/24 2218   WBC 14.82*   RBC 3.91*   HGB 12.9   HCT 38.3      MCV 98   MCH 33.0*   MCHC 33.7     CMP:   Recent Labs   Lab 11/16/24 2218   *   CALCIUM 8.6*   ALBUMIN 4.4   PROT 7.6      K 4.2   CO2 27   CL 99   BUN 16   CREATININE 1.24   ALKPHOS 69   ALT 33   AST 29   BILITOT 0.7       Significant Diagnostics:  I have reviewed all pertinent imaging results/findings within the past 24 hours.    Assessment/Plan:   Cholelithiasis  53F with abdominal pain and cholelithiasis on imaging.    - No acute surgical intervention indicated. Suspect patient's abdominal pain/imaging more consistent with enteritis as she has improved without surgical intervention however some noted minimal gallbladder wall thickening on imaging  - Ok to advance diet as tolerated from surgical standpoint  - Rest per primary  - Please call with any questions      VTE Risk Mitigation (From admission, onward)           Ordered     Reason for No Pharmacological VTE Prophylaxis  Once        Question:  Reasons:  Answer:  Patient is Ambulatory    11/17/24 0449     IP VTE HIGH RISK PATIENT  Once         11/17/24 0449     Place sequential compression device  Until discontinued         11/17/24 0449                    Thank you for your consult. I will follow-up with patient. Please contact us if you have any additional questions.    Zoraida Zacarias MD  General Surgery  [facility] - Emergency  Dept      Pt seen and examined.  Agree with note and plan.    Sonny Naylor M.D., F.A.C.S.  [id number] and General Surgery  [facility] [place] [facility]

## 2024-11-17 NOTE — HPI
Patient is a 53-year-old female who presented to ED at [facility] with 1 day of abdominal pain centrally located progressively worsening.  Also having an episode of nausea and vomiting, she denies any bloody stools.  In ED patient with  leukocytosis and tachycardia she was given 30 cc/kg IBW and broad-spectrum antibiotics appropriate for intra-abdominal processes were initiated, CT scan shows intra-abdominal free fluid, some distended loops of bowel, and some pericholecystic/perihepatic free fluid, as well as which collectively in the setting is equivocal for enteritis versus acute cholecystitis.  Case was discussed by ED MD and general surgeon who requested transferred to [facility] [place].  Ultrasound was obtained, which showed cholelithiasis without evidence of acute cholecystitis.  Consider HIDA scan patient admitted to Hospital Medicine, we will continue on Zosyn,  keep NPO.  General surgery to evaluate the this a.m.

## 2024-11-17 NOTE — ED NOTES
House Supervisor, John notified of patient arrival to room r/t attempt to notify ordering provider.

## 2024-11-17 NOTE — ASSESSMENT & PLAN NOTE
53F with abdominal pain and cholelithiasis on imaging.    - No acute surgical intervention indicated. Suspect patient's abdominal pain/imaging more consistent with enteritis as she has improved without surgical intervention however some noted minimal gallbladder wall thickening on imaging  - Would recommend HIDA for further evaluation  - Ok to advance diet as tolerated from surgical standpoint  - Rest per primary  - Please call with any questions

## 2024-11-18 ENCOUNTER — TELEPHONE (OUTPATIENT)
Dept: FAMILY MEDICINE | Facility: CLINIC | Age: 54
End: 2024-11-18
Payer: COMMERCIAL

## 2024-11-18 VITALS
DIASTOLIC BLOOD PRESSURE: 88 MMHG | RESPIRATION RATE: 18 BRPM | HEART RATE: 77 BPM | HEIGHT: 66 IN | SYSTOLIC BLOOD PRESSURE: 133 MMHG | TEMPERATURE: 98 F | WEIGHT: 250.5 LBS | BODY MASS INDEX: 40.26 KG/M2 | OXYGEN SATURATION: 100 %

## 2024-11-18 LAB
ALBUMIN SERPL BCP-MCNC: 3.3 G/DL (ref 3.5–5.2)
ALP SERPL-CCNC: 53 U/L (ref 40–150)
ALT SERPL W/O P-5'-P-CCNC: 21 U/L (ref 10–44)
ANION GAP SERPL CALC-SCNC: 10 MMOL/L (ref 8–16)
AST SERPL-CCNC: 17 U/L (ref 10–40)
BASOPHILS # BLD AUTO: 0.02 K/UL (ref 0–0.2)
BASOPHILS NFR BLD: 0.2 % (ref 0–1.9)
BILIRUB SERPL-MCNC: 0.7 MG/DL (ref 0.1–1)
BUN SERPL-MCNC: 11 MG/DL (ref 6–20)
CALCIUM SERPL-MCNC: 8.3 MG/DL (ref 8.7–10.5)
CHLORIDE SERPL-SCNC: 102 MMOL/L (ref 95–110)
CO2 SERPL-SCNC: 26 MMOL/L (ref 23–29)
CREAT SERPL-MCNC: 1.3 MG/DL (ref 0.5–1.4)
DIFFERENTIAL METHOD BLD: ABNORMAL
EOSINOPHIL # BLD AUTO: 0.2 K/UL (ref 0–0.5)
EOSINOPHIL NFR BLD: 2 % (ref 0–8)
ERYTHROCYTE [DISTWIDTH] IN BLOOD BY AUTOMATED COUNT: 14.2 % (ref 11.5–14.5)
EST. GFR  (NO RACE VARIABLE): 49 ML/MIN/1.73 M^2
GLUCOSE SERPL-MCNC: 91 MG/DL (ref 70–110)
HCT VFR BLD AUTO: 32.3 % (ref 37–48.5)
HGB BLD-MCNC: 10.6 G/DL (ref 12–16)
IMM GRANULOCYTES # BLD AUTO: 0.03 K/UL (ref 0–0.04)
IMM GRANULOCYTES NFR BLD AUTO: 0.3 % (ref 0–0.5)
LYMPHOCYTES # BLD AUTO: 3.2 K/UL (ref 1–4.8)
LYMPHOCYTES NFR BLD: 32.8 % (ref 18–48)
MCH RBC QN AUTO: 32.3 PG (ref 27–31)
MCHC RBC AUTO-ENTMCNC: 32.8 G/DL (ref 32–36)
MCV RBC AUTO: 99 FL (ref 82–98)
MONOCYTES # BLD AUTO: 0.7 K/UL (ref 0.3–1)
MONOCYTES NFR BLD: 6.7 % (ref 4–15)
NEUTROPHILS # BLD AUTO: 5.7 K/UL (ref 1.8–7.7)
NEUTROPHILS NFR BLD: 58 % (ref 38–73)
NRBC BLD-RTO: 0 /100 WBC
PLATELET # BLD AUTO: 237 K/UL (ref 150–450)
PMV BLD AUTO: 9.8 FL (ref 9.2–12.9)
POCT GLUCOSE: 86 MG/DL (ref 70–110)
POCT GLUCOSE: 87 MG/DL (ref 70–110)
POTASSIUM SERPL-SCNC: 3.9 MMOL/L (ref 3.5–5.1)
PROT SERPL-MCNC: 6.6 G/DL (ref 6–8.4)
RBC # BLD AUTO: 3.28 M/UL (ref 4–5.4)
SODIUM SERPL-SCNC: 138 MMOL/L (ref 136–145)
WBC # BLD AUTO: 9.8 K/UL (ref 3.9–12.7)

## 2024-11-18 PROCEDURE — G0378 HOSPITAL OBSERVATION PER HR: HCPCS

## 2024-11-18 PROCEDURE — 85025 COMPLETE CBC W/AUTO DIFF WBC: CPT | Performed by: REGISTERED NURSE

## 2024-11-18 PROCEDURE — 36415 COLL VENOUS BLD VENIPUNCTURE: CPT | Performed by: REGISTERED NURSE

## 2024-11-18 PROCEDURE — 99212 OFFICE O/P EST SF 10 MIN: CPT | Mod: ,,, | Performed by: SURGERY

## 2024-11-18 PROCEDURE — 96376 TX/PRO/DX INJ SAME DRUG ADON: CPT

## 2024-11-18 PROCEDURE — 63600175 PHARM REV CODE 636 W HCPCS: Performed by: REGISTERED NURSE

## 2024-11-18 PROCEDURE — 25000003 PHARM REV CODE 250: Performed by: REGISTERED NURSE

## 2024-11-18 PROCEDURE — 80053 COMPREHEN METABOLIC PANEL: CPT | Performed by: REGISTERED NURSE

## 2024-11-18 PROCEDURE — 96366 THER/PROPH/DIAG IV INF ADDON: CPT

## 2024-11-18 RX ADMIN — HYDROMORPHONE HYDROCHLORIDE 1 MG: 1 INJECTION, SOLUTION INTRAMUSCULAR; INTRAVENOUS; SUBCUTANEOUS at 12:11

## 2024-11-18 RX ADMIN — PIPERACILLIN SODIUM AND TAZOBACTAM SODIUM 4.5 G: 4; .5 INJECTION, POWDER, LYOPHILIZED, FOR SOLUTION INTRAVENOUS at 02:11

## 2024-11-18 RX ADMIN — PIPERACILLIN SODIUM AND TAZOBACTAM SODIUM 4.5 G: 4; .5 INJECTION, POWDER, LYOPHILIZED, FOR SOLUTION INTRAVENOUS at 09:11

## 2024-11-18 NOTE — PLAN OF CARE
Re:  Amanda Colbert, WORK / SCHOOL EXCUSE    Date: 11/18/2024           [facility] [place] [facility] [address] [place] [address]  Office: [phone]  Fax: [phone]     To whom it may concern:    Ms. Amanda Colbert has been hospitalized at the [facility] [place] since 11/16/2024.  Please excuse the patient from duties.  Patient may return on 11/20/2024.  No restrictions.     Please contact me if you have any questions.                __________________________  Kaylin Crespo MD

## 2024-11-18 NOTE — PLAN OF CARE
Problem: Adult Inpatient Plan of Care  Goal: Plan of Care Review  Outcome: Progressing     VIRTUAL NURSE:  Called patient's room via telephone; verified spelling of patient's name and birthdate.  Introduced as VN for night shift that will be working with floor nurse and nursing assistant.  Educated patient on VN's role in patient care and VIP model.  Plan of care reviewed with patient.  Education per flowsheet.   Informed patient that staff will round on them every 2 hours but to use call light for any other needs they may have; informed of fall risk and fall precautions.  Patient verbalized understanding.  Call light within reach per patient.  Opportunity given for questions and questions answered.  Admission assessment questions answered.  Patient denies complaints or any needs at this time. Instructed to call for assistance.  Will cont to monitor and intervene as needed.     Labs, notes, orders, and careplan initiated.        11/17/24 2240   Patient Request   Patient Requested phone admit d/t no VN equipment in patient room   Admission   Initial VN Admission Questions Complete   Communication Issues? Technical Issue   Shift   Pain Management Interventions pain management plan reviewed with patient/caregiver   Pain/Comfort/Sleep   Comfort/Acceptable Pain Level 3   Pain Rating (0-10): Rest 3   Pain Radiation to abdomen   POSS ([place] Opioid-Induced Sed Scale) 1 - Awake and alert   Sleep/Rest/Relaxation no problem identified;awake

## 2024-11-18 NOTE — ED NOTES
Attempted to call report. Advised by Xiomara that room has not been assigned yet and to call back.

## 2024-11-18 NOTE — ASSESSMENT & PLAN NOTE
53F with abdominal pain and cholelithiasis on imaging.    - No acute surgical intervention indicated. Not likely cholecystitis.   -Pain essentially resolved this am  -No abx from our standpoint  -FLD then as tolerated.   - Rest per primary  - Please call with any questions

## 2024-11-18 NOTE — TELEPHONE ENCOUNTER
----- Message from  Amira sent at 11/18/2024  9:29 AM CST -----  Regarding: Hospital Follow-Up  Hel!       Patient is admitted here at [facility] [place]. She will be discharging today. Can you please schedule patient a hospital follow-up appointment? Thank you!    Thank you!  Amira Roberts RN    [phone]

## 2024-11-18 NOTE — PROGRESS NOTES
[facility]  Progress Note    Patient Name: Amanda Colbert  MRN: [id number]  Patient Class: OP- Observation   Admission Date: 11/16/2024  Length of Stay: 0 days  Attending Physician: Kaylin Crespo*  Primary Care Provider: Roge Gayle MD        Subjective:     Principal Problem:Cholelithiasis        HPI:  Patient is a 53-year-old female who presented to ED at [facility] with 1 day of abdominal pain centrally located progressively worsening.  Also having an episode of nausea and vomiting, she denies any bloody stools.  In ED patient with  leukocytosis and tachycardia she was given 30 cc/kg IBW and broad-spectrum antibiotics appropriate for intra-abdominal processes were initiated, CT scan shows intra-abdominal free fluid, some distended loops of bowel, and some pericholecystic/perihepatic free fluid, as well as which collectively in the setting is equivocal for enteritis versus acute cholecystitis.  Case was discussed by ED MD and general surgeon who requested transferred to [facility].  Ultrasound was obtained, which showed cholelithiasis without evidence of acute cholecystitis.  Consider HIDA scan patient admitted to Hospital Medicine, we will continue on Zosyn,  keep NPO.  General surgery to evaluate the this a.m.    Overview/Hospital Course:  No notes on file    Interval History:  Awake, alert, abdominal pain resolved.  Denies nausea  Appreciate surgery recs no surgical intervention indicated.  No antibiotic needed  Possible discharge today    Review of Systems   Constitutional:  Negative for activity change and appetite change.   Gastrointestinal:  Negative for abdominal pain, nausea and vomiting.   Skin:  Negative for wound.   Psychiatric/Behavioral:  Negative for confusion.      Objective:     Vital Signs (Most Recent):  Temp: 98.3 °F (36.8 °C) (11/18/24 0739)  Pulse: 84 (11/18/24 0739)  Resp: 18 (11/18/24 0739)  BP: 130/70 (11/18/24 0739)  SpO2: 99 % (11/18/24  "5578) Vital Signs (24h Range):  Temp:  [97.7 °F (36.5 °C)-98.7 °F (37.1 °C)] 98.3 °F (36.8 °C)  Pulse:  [] 84  Resp:  [14-18] 18  SpO2:  [94 %-100 %] 99 %  BP: (101-154)/(61-79) 130/70     Weight: 113.6 kg (250 lb 8.1 oz)  Body mass index is 40.43 kg/m².    Intake/Output Summary (Last 24 hours) at 11/18/2024 1029  Last data filed at 11/17/2024 1903  Gross per 24 hour   Intake 1428.8 ml   Output --   Net 1428.8 ml         Physical Exam  Vitals reviewed.   Constitutional:       Appearance: She is obese.   HENT:      Head: Normocephalic.   Cardiovascular:      Rate and Rhythm: Normal rate and regular rhythm.      Pulses: Normal pulses.      Heart sounds: Normal heart sounds.   Pulmonary:      Effort: Pulmonary effort is normal.      Breath sounds: Normal breath sounds.   Abdominal:      General: Bowel sounds are normal.      Palpations: Abdomen is soft.   Musculoskeletal:         General: Normal range of motion.      Cervical back: Normal range of motion.   Skin:     General: Skin is warm and dry.      Capillary Refill: Capillary refill takes less than 2 seconds.   Neurological:      Mental Status: She is alert and oriented to person, place, and time. Mental status is at baseline.   Psychiatric:         Mood and Affect: Mood normal.         Behavior: Behavior normal.             Significant Labs: A1C:   Recent Labs   Lab 06/19/24  0742 11/17/24  0900   HGBA1C 6.2* 5.6     ABGs: No results for input(s): "PH", "PCO2", "HCO3", "POCSATURATED", "BE", "TOTALHB", "COHB", "METHB", "O2HB", "POCFIO2", "PO2" in the last 48 hours.  Blood Culture:   Recent Labs   Lab 11/17/24  0109 11/17/24  0138   LABBLOO No Growth to date No Growth to date     CBC:   Recent Labs   Lab 11/16/24  2218 11/18/24  0312   WBC 14.82* 9.80   HGB 12.9 10.6*   HCT 38.3 32.3*    237     CMP:   Recent Labs   Lab 11/16/24 2218 11/18/24  0312    138   K 4.2 3.9   CL 99 102   CO2 27 26   * 91   BUN 16 11   CREATININE 1.24 1.3 " "  CALCIUM 8.6* 8.3*   PROT 7.6 6.6   ALBUMIN 4.4 3.3*   BILITOT 0.7 0.7   ALKPHOS 69 53   AST 29 17   ALT 33 21   ANIONGAP 10 10     Lactic Acid:   Recent Labs   Lab 11/16/24  2353   LACTATE 1.0     Lipase: No results for input(s): "LIPASE" in the last 48 hours.  Lipid Panel: No results for input(s): "CHOL", "HDL", "LDLCALC", "TRIG", "CHOLHDL" in the last 48 hours.  Magnesium: No results for input(s): "MG" in the last 48 hours.  Troponin: No results for input(s): "TROPONINI", "TROPONINIHS" in the last 48 hours.  TSH: No results for input(s): "TSH" in the last 4320 hours.  Urine Culture: No results for input(s): "LABURIN" in the last 48 hours.  Urine Studies:   Recent Labs   Lab 11/16/24  2342   COLORU Yellow   APPEARANCEUA Clear   PHUR 5.0   SPECGRAV 1.015   PROTEINUA Negative   GLUCUA Negative   KETONESU 1+*   BILIRUBINUA Negative   OCCULTUA Negative   NITRITE Negative   UROBILINOGEN Negative   LEUKOCYTESUR Negative       Significant Imaging: I have reviewed all pertinent imaging results/findings within the past 24 hours.    Assessment/Plan:      Leukocytosis  WBC 14.8, tachycardic,  Lactic acid normal  Given 30 cc/kg IV fluid bolus  Started on antibiotics    Source likely intra-abdominal  CT showing enteritis versus cholecystitis  Ultrasound without evidence of cholecystitis  General surgery consult        Abdominal pain  Abdominal pain x1 day  Associated nausea and vomiting  LFTs normal  CT scan shows intra-abdominal free fluid, some distended loops of bowel, and some pericholecystic/perihepatic free fluid, as well as which collectively in the setting is equivocal for enteritis versus acute cholecystitis  Ultrasound shows cholelithiasis without evidence of acute cholecystitis    NPO-clear liquid diet-advance  General surgery consult-appreciate recs-no surgical intervention indicated.  No need for antibiotics  IV fluids    Continue Zosyn - deescalate    DM (diabetes mellitus)  Patient's FSGs are controlled on " current medication regimen.  Last A1c reviewed-   Lab Results   Component Value Date    HGBA1C 5.6 11/17/2024     Most recent fingerstick glucose reviewed-   Recent Labs   Lab 11/17/24  0445   POCTGLUCOSE 137*       Current correctional scale  Low  Maintain anti-hyperglycemic dose as follows-   Antihyperglycemics (From admission, onward)      Start     Stop Route Frequency Ordered    11/17/24 0807  insulin aspart U-100 pen 0-5 Units         -- SubQ Every 6 hours PRN 11/17/24 0708          Hold Oral hypoglycemics while patient is in the hospital.      VTE Risk Mitigation (From admission, onward)           Ordered     Reason for No Pharmacological VTE Prophylaxis  Once        Question:  Reasons:  Answer:  Patient is Ambulatory    11/17/24 0449     IP VTE HIGH RISK PATIENT  Once         11/17/24 0449     Place sequential compression device  Until discontinued         11/17/24 0449                    Discharge Planning   PORTIA: 11/18/2024     Code Status: Full Code   Is the patient medically ready for discharge?:     Reason for patient still in hospital (select all that apply): Pending disposition  Discharge Plan A: Home   Discharge Delays: None known at this time              Kaylin Crespo MD  Department of Hospital Medicine   [facility]

## 2024-11-18 NOTE — PLAN OF CARE
went to meet with patient. Patient reports she is independent and lives at home with her spouse. She does not use any DME or have Home Health. Patient still drives, but family will transport home at discharge. Surgery stated no intervention on patient. PCP follow-up only needed. In-Basket message sent to PCP office for follow-up appointment. Patient encouraged to call with any questions or concerns.  will continue to follow patient through transitions of care and assist with any discharge needs.     Other Contacts    Name Relation Home Work Mobile   Home Colbert Spouse   [phone]     Future Appointments   Date Time Provider Department Center   2/20/2025  9:00 AM Roge Gayle MD [facility]         11/18/24 0932   Discharge Assessment   Assessment Type Discharge Planning Assessment   Confirmed/corrected address, phone number and insurance Yes   Confirmed Demographics Correct on Facesheet   Source of Information patient   People in Home significant other;spouse   Facility Arrived From: Home   Do you expect to return to your current living situation? Yes   Do you have help at home or someone to help you manage your care at home? Yes   Who are your caregiver(s) and their phone number(s)? Home Colbert Spouse   [phone]   Prior to hospitilization cognitive status: Alert/Oriented   Current cognitive status: Alert/Oriented   Walking or Climbing Stairs Difficulty no   Dressing/Bathing Difficulty no   Equipment Currently Used at Home none   Do you take prescription medications? Yes   Do you have prescription coverage? Yes   Do you have any problems affording any of your prescribed medications? No   Is the patient taking medications as prescribed? yes   Who is going to help you get home at discharge? Home Colbert Spouse   [phone]   How do you get to doctors appointments? car, drives self   Are you on dialysis? No   Do you take coumadin? No   Discharge Plan A  Home   Discharge Plan B Home with family   DME Needed Upon Discharge  none   Discharge Plan discussed with: Patient   Transition of Care Barriers None     Amira Roberts RN    [phone]

## 2024-11-18 NOTE — SUBJECTIVE & OBJECTIVE
Interval History: NAEO. Minimal pain. No nausea and vomiting. Having bowel function.     Medications:  Continuous Infusions:  Scheduled Meds:   piperacillin-tazobactam (Zosyn) IV (PEDS and ADULTS) (extended infusion is not appropriate)  4.5 g Intravenous Q8H     PRN Meds:  Current Facility-Administered Medications:     dextrose 10%, 12.5 g, Intravenous, PRN    dextrose 10%, 12.5 g, Intravenous, PRN    dextrose 10%, 25 g, Intravenous, PRN    dextrose 10%, 25 g, Intravenous, PRN    glucagon (human recombinant), 1 mg, Intramuscular, PRN    glucagon (human recombinant), 1 mg, Intramuscular, PRN    glucose, 16 g, Oral, PRN    glucose, 24 g, Oral, PRN    HYDROmorphone, 1 mg, Intravenous, Q6H PRN    influenza, 0.5 mL, Intramuscular, vaccine x 1 dose    insulin aspart U-100, 0-5 Units, Subcutaneous, Q6H PRN    naloxone, 0.02 mg, Intravenous, PRN    ondansetron, 4 mg, Intravenous, Q8H PRN    sodium chloride 0.9%, 10 mL, Intravenous, Q12H PRN     Review of patient's allergies indicates:  No Known Allergies  Objective:     Vital Signs (Most Recent):  Temp: 97.8 °F (36.6 °C) (11/18/24 0452)  Pulse: 79 (11/18/24 0452)  Resp: 18 (11/18/24 0452)  BP: 124/68 (11/18/24 0452)  SpO2: 99 % (11/18/24 0452) Vital Signs (24h Range):  Temp:  [97.7 °F (36.5 °C)-98.7 °F (37.1 °C)] 97.8 °F (36.6 °C)  Pulse:  [] 79  Resp:  [14-18] 18  SpO2:  [94 %-100 %] 99 %  BP: (101-154)/(56-79) 124/68     Weight: 113.6 kg (250 lb 8.1 oz)  Body mass index is 40.43 kg/m².    Intake/Output - Last 3 Shifts         11/16 0700  11/17 0659 11/17 0700 11/18 0659 11/18 0700  11/19 0659    I.V. (mL/kg)  1338.2 (11.8)     IV Piggyback 2100 90.6     Total Intake(mL/kg) 2100 (18.1) 1428.8 (12.6)     Net +2100 +1428.8                     Physical Exam  Constitutional:       General: She is not in acute distress.     Appearance: Normal appearance. She is obese.   HENT:      Head: Normocephalic and atraumatic.   Eyes:      General: No scleral icterus.      Extraocular Movements: Extraocular movements intact.   Cardiovascular:      Rate and Rhythm: Normal rate.   Pulmonary:      Effort: Pulmonary effort is normal. No respiratory distress.   Abdominal:      General: Abdomen is flat. There is no distension.      Palpations: Abdomen is soft.      Comments: Soft, ND, NT   Musculoskeletal:         General: Normal range of motion.   Skin:     General: Skin is warm and dry.      Coloration: Skin is not jaundiced.   Neurological:      General: No focal deficit present.      Mental Status: She is alert and oriented to person, place, and time.   Psychiatric:         Mood and Affect: Mood normal.         Behavior: Behavior normal.         Thought Content: Thought content normal.          Significant Labs:  I have reviewed all pertinent lab results within the past 24 hours.    Significant Diagnostics:  I have reviewed all pertinent imaging results/findings within the past 24 hours.

## 2024-11-18 NOTE — NURSING
Pt tolerated lunch. AVS printed and given to pt - VN to review. No medications to be delivered to bedside. Pt to discharge to home with spouse.

## 2024-11-18 NOTE — PLAN OF CARE
Discharge orders noted. No DME or Home Health ordered. PCP follow-up requested. No further Case Management needs.    Other Contacts    Name Relation Home Work Mobile   Home Colbert Spouse   [phone]     Future Appointments   Date Time Provider Department Center   11/25/2024  9:40 AM Roge Gayle MD [facility] [place] [facility]   2/20/2025  9:00 AM Roge Gayle MD [facility] [place] [facility]          11/18/24 0933   Final Note   Assessment Type Final Discharge Note   Anticipated Discharge Disposition Home   Hospital Resources/Appts/Education Provided Appointments scheduled and added to AVS   Post-Acute Status   Discharge Delays None known at this time     Amira Roberts RN    [phone]

## 2024-11-18 NOTE — PLAN OF CARE
Problem: Adult Inpatient Plan of Care  Goal: Plan of Care Review  Outcome: Progressing  Goal: Patient-Specific Goal (Individualized)  Outcome: Progressing  Goal: Readiness for Transition of Care  Outcome: Progressing     Problem: Diabetes Comorbidity  Goal: Blood Glucose Level Within Targeted Range  Outcome: Progressing     Problem: Pain Acute  Goal: Optimal Pain Control and Function  Outcome: Progressing     Problem: Fall Injury Risk  Goal: Absence of Fall and Fall-Related Injury  Outcome: Progressing

## 2024-11-18 NOTE — SUBJECTIVE & OBJECTIVE
Interval History:  Awake, alert, abdominal pain resolved.  Denies nausea  Appreciate surgery recs no surgical intervention indicated.  No antibiotic needed  Possible discharge today    Review of Systems   Constitutional:  Negative for activity change and appetite change.   Gastrointestinal:  Negative for abdominal pain, nausea and vomiting.   Skin:  Negative for wound.   Psychiatric/Behavioral:  Negative for confusion.      Objective:     Vital Signs (Most Recent):  Temp: 98.3 °F (36.8 °C) (11/18/24 0739)  Pulse: 84 (11/18/24 0739)  Resp: 18 (11/18/24 0739)  BP: 130/70 (11/18/24 0739)  SpO2: 99 % (11/18/24 0739) Vital Signs (24h Range):  Temp:  [97.7 °F (36.5 °C)-98.7 °F (37.1 °C)] 98.3 °F (36.8 °C)  Pulse:  [] 84  Resp:  [14-18] 18  SpO2:  [94 %-100 %] 99 %  BP: (101-154)/(61-79) 130/70     Weight: 113.6 kg (250 lb 8.1 oz)  Body mass index is 40.43 kg/m².    Intake/Output Summary (Last 24 hours) at 11/18/2024 1029  Last data filed at 11/17/2024 1903  Gross per 24 hour   Intake 1428.8 ml   Output --   Net 1428.8 ml         Physical Exam  Vitals reviewed.   Constitutional:       Appearance: She is obese.   HENT:      Head: Normocephalic.   Cardiovascular:      Rate and Rhythm: Normal rate and regular rhythm.      Pulses: Normal pulses.      Heart sounds: Normal heart sounds.   Pulmonary:      Effort: Pulmonary effort is normal.      Breath sounds: Normal breath sounds.   Abdominal:      General: Bowel sounds are normal.      Palpations: Abdomen is soft.   Musculoskeletal:         General: Normal range of motion.      Cervical back: Normal range of motion.   Skin:     General: Skin is warm and dry.      Capillary Refill: Capillary refill takes less than 2 seconds.   Neurological:      Mental Status: She is alert and oriented to person, place, and time. Mental status is at baseline.   Psychiatric:         Mood and Affect: Mood normal.         Behavior: Behavior normal.             Significant Labs: A1C:   Recent  "Labs   Lab 06/19/24  0742 11/17/24  0900   HGBA1C 6.2* 5.6     ABGs: No results for input(s): "PH", "PCO2", "HCO3", "POCSATURATED", "BE", "TOTALHB", "COHB", "METHB", "O2HB", "POCFIO2", "PO2" in the last 48 hours.  Blood Culture:   Recent Labs   Lab 11/17/24  0109 11/17/24  0138   LABBLOO No Growth to date No Growth to date     CBC:   Recent Labs   Lab 11/16/24 2218 11/18/24 0312   WBC 14.82* 9.80   HGB 12.9 10.6*   HCT 38.3 32.3*    237     CMP:   Recent Labs   Lab 11/16/24 2218 11/18/24 0312    138   K 4.2 3.9   CL 99 102   CO2 27 26   * 91   BUN 16 11   CREATININE 1.24 1.3   CALCIUM 8.6* 8.3*   PROT 7.6 6.6   ALBUMIN 4.4 3.3*   BILITOT 0.7 0.7   ALKPHOS 69 53   AST 29 17   ALT 33 21   ANIONGAP 10 10     Lactic Acid:   Recent Labs   Lab 11/16/24  2353   LACTATE 1.0     Lipase: No results for input(s): "LIPASE" in the last 48 hours.  Lipid Panel: No results for input(s): "CHOL", "HDL", "LDLCALC", "TRIG", "CHOLHDL" in the last 48 hours.  Magnesium: No results for input(s): "MG" in the last 48 hours.  Troponin: No results for input(s): "TROPONINI", "TROPONINIHS" in the last 48 hours.  TSH: No results for input(s): "TSH" in the last 4320 hours.  Urine Culture: No results for input(s): "LABURIN" in the last 48 hours.  Urine Studies:   Recent Labs   Lab 11/16/24  2342   COLORU Yellow   APPEARANCEUA Clear   PHUR 5.0   SPECGRAV 1.015   PROTEINUA Negative   GLUCUA Negative   KETONESU 1+*   BILIRUBINUA Negative   OCCULTUA Negative   NITRITE Negative   UROBILINOGEN Negative   LEUKOCYTESUR Negative       Significant Imaging: I have reviewed all pertinent imaging results/findings within the past 24 hours.  "

## 2024-11-18 NOTE — DISCHARGE SUMMARY
[facility]  Discharge Summary      Patient Name: Amanda Colbert  MRN: [id number]  MATTEO: [id number]  Patient Class: OP- Observation  Admission Date: 11/16/2024  Hospital Length of Stay: 0 days  Discharge Date and Time: 11/18/2024  3:55 PM  Attending Physician: Kaylin Crespo*   Discharging Provider: Kaylin Crespo MD  Primary Care Provider: Roge Gayle MD    Primary Care Team: Networked reference to record PCT     HPI:   Patient is a 53-year-old female who presented to ED at [facility] with 1 day of abdominal pain centrally located progressively worsening.  Also having an episode of nausea and vomiting, she denies any bloody stools.  In ED patient with  leukocytosis and tachycardia she was given 30 cc/kg IBW and broad-spectrum antibiotics appropriate for intra-abdominal processes were initiated, CT scan shows intra-abdominal free fluid, some distended loops of bowel, and some pericholecystic/perihepatic free fluid, as well as which collectively in the setting is equivocal for enteritis versus acute cholecystitis.  Case was discussed by ED MD and general surgeon who requested transferred to [facility].  Ultrasound was obtained, which showed cholelithiasis without evidence of acute cholecystitis.  Consider HIDA scan patient admitted to Hospital Medicine, we will continue on Zosyn,  keep NPO.  General surgery to evaluate the this a.m.    * No surgery found *      Hospital Course:   No notes on file     Goals of Care Treatment Preferences:  Code Status: Full Code      SDOH Screening:  The patient was screened for utility difficulties, food insecurity, transport difficulties, housing insecurity, and interpersonal safety and there were no concerns identified this admission.     Consults:   Consults (From admission, onward)          Status Ordering Provider     General Surgery  Once        Provider:  (Not yet assigned)    Completed DAVIDE KAYE             Oncology  Leukocytosis  WBC 14.8, tachycardic,  Lactic acid normal  Given 30 cc/kg IV fluid bolus  Started on antibiotics-discussed    Source likely intra-abdominal  CT showing enteritis versus cholecystitis  Ultrasound without evidence of cholecystitis  General surgery consult-no need for antibiotics        Endocrine  DM (diabetes mellitus)  Patient's FSGs are controlled on current medication regimen.  Last A1c reviewed-   Lab Results   Component Value Date    HGBA1C 5.6 11/17/2024     Most recent fingerstick glucose reviewed-   Recent Labs   Lab 11/17/24  0445   POCTGLUCOSE 137*       Current correctional scale  Low  Maintain anti-hyperglycemic dose as follows-   Antihyperglycemics (From admission, onward)      Start     Stop Route Frequency Ordered    11/17/24 0807  insulin aspart U-100 pen 0-5 Units         -- SubQ Every 6 hours PRN 11/17/24 0708          Hold Oral hypoglycemics while patient is in the hospital.    GI  Abdominal pain  Abdominal pain x1 day  Associated nausea and vomiting  LFTs normal  CT scan shows intra-abdominal free fluid, some distended loops of bowel, and some pericholecystic/perihepatic free fluid, as well as which collectively in the setting is equivocal for enteritis versus acute cholecystitis  Ultrasound shows cholelithiasis without evidence of acute cholecystitis    NPO-clear liquid diet-advance  General surgery consult-appreciate recs-no surgical intervention indicated.  No need for antibiotics  IV fluids    Continue Zosyn - deescalate      Final Active Diagnoses:    Diagnosis Date Noted POA    PRINCIPAL PROBLEM:  Cholelithiasis [K80.20] 11/17/2024 Yes    Abdominal pain [R10.9] 11/17/2024 Yes    Leukocytosis [D72.829] 11/17/2024 Yes    DM (diabetes mellitus) [E11.9] 10/24/2023 Yes      Problems Resolved During this Admission:       Discharged Condition: stable    Disposition: Home or Self Care    Follow Up:   Follow-up Information       Roge Gayle MD Follow up.     Specialty: Family Medicine  Why: Message sent to PCP office for follow-up.  Contact information:  [address] [place] [id number] [phone]                           Patient Instructions:      Diet diabetic     Diet Cardiac     Activity as tolerated       Significant Diagnostic Studies: N/A    Pending Diagnostic Studies:       None           Medications:  Reconciled Home Medications:      Medication List        CONTINUE taking these medications      amitriptyline 10 MG tablet  Commonly known as: ELAVIL  One or two nightly for insomnia     atorvastatin 20 MG tablet  Commonly known as: LIPITOR  Take 1 tablet by mouth once daily     blood sugar diagnostic Strp  Commonly known as: TRUETEST TEST STRIPS  TEST BLOOD SUGAR LEVEL DAILY     hydroCHLOROthiazide 12.5 mg capsule  Commonly known as: MICROZIDE  Take 1 capsule by mouth once daily     lisinopriL 20 MG tablet  Commonly known as: PRINIVIL,ZESTRIL  Take 1 tablet by mouth once daily     meloxicam 7.5 MG tablet  Commonly known as: MOBIC  Take 1 tablet by mouth once daily     metFORMIN 500 MG tablet  Commonly known as: GLUCOPHAGE  TAKE 1 TABLET BY MOUTH TWICE DAILY WITH MEALS     metoprolol tartrate 100 MG tablet  Commonly known as: LOPRESSOR  Take 0.5 tablets (50 mg total) by mouth 2 (two) times daily.     tirzepatide 7.5 mg/0.5 mL Pnij  Inject 7.5 mg into the skin every 7 days.              Indwelling Lines/Drains at time of discharge:   Lines/Drains/Airways       None                   Time spent on the discharge of patient: 35 minutes         Kaylin Crespo MD  Department of Hospital Medicine  [facility] - Telemetry

## 2024-11-18 NOTE — TELEPHONE ENCOUNTER
Good morning!    I've scheduled Ms. Colbert for a hospital follow up with Dr. Sanchez on 11/25/24 at 9:40 am.      Thanks,    Deann

## 2024-11-18 NOTE — PROGRESS NOTES
[facility]  General Surgery  Progress Note    Subjective:     History of Present Illness:  Amanda Colbert is a 53F with pmhx t2dm, htn, hld who presented to the ED with complaints of jhonatan-umbilical/epigastric abdominal pain and n/v for 1 day.  WBC and CRP mildly elevated. LFTs wnl. CT obtained which showed some edematous small bowel with surrounding fluid and perihepatic fluid concerning for enteritis. Cholelithiasis also noted with mildly distended gallbladder and General Surgery consulted for evaluation. On exam this morning patient says the pain has nearly completely resolved. Very minimal tenderness on deep palpation of periumbilical region.     Surgical History includes       Post-Op Info:  * No surgery found *         Interval History: NAEO. Minimal pain. No nausea and vomiting. Having bowel function.     Medications:  Continuous Infusions:  Scheduled Meds:   piperacillin-tazobactam (Zosyn) IV (PEDS and ADULTS) (extended infusion is not appropriate)  4.5 g Intravenous Q8H     PRN Meds:  Current Facility-Administered Medications:     dextrose 10%, 12.5 g, Intravenous, PRN    dextrose 10%, 12.5 g, Intravenous, PRN    dextrose 10%, 25 g, Intravenous, PRN    dextrose 10%, 25 g, Intravenous, PRN    glucagon (human recombinant), 1 mg, Intramuscular, PRN    glucagon (human recombinant), 1 mg, Intramuscular, PRN    glucose, 16 g, Oral, PRN    glucose, 24 g, Oral, PRN    HYDROmorphone, 1 mg, Intravenous, Q6H PRN    influenza, 0.5 mL, Intramuscular, vaccine x 1 dose    insulin aspart U-100, 0-5 Units, Subcutaneous, Q6H PRN    naloxone, 0.02 mg, Intravenous, PRN    ondansetron, 4 mg, Intravenous, Q8H PRN    sodium chloride 0.9%, 10 mL, Intravenous, Q12H PRN     Review of patient's allergies indicates:  No Known Allergies  Objective:     Vital Signs (Most Recent):  Temp: 97.8 °F (36.6 °C) (24)  Pulse: 79 (24)  Resp: 18 (24)  BP: 124/68 (24)  SpO2: 99 %  (11/18/24 0452) Vital Signs (24h Range):  Temp:  [97.7 °F (36.5 °C)-98.7 °F (37.1 °C)] 97.8 °F (36.6 °C)  Pulse:  [] 79  Resp:  [14-18] 18  SpO2:  [94 %-100 %] 99 %  BP: (101-154)/(56-79) 124/68     Weight: 113.6 kg (250 lb 8.1 oz)  Body mass index is 40.43 kg/m².    Intake/Output - Last 3 Shifts         11/16 0700  11/17 0659 11/17 0700  11/18 0659 11/18 0700  11/19 0659    I.V. (mL/kg)  1338.2 (11.8)     IV Piggyback 2100 90.6     Total Intake(mL/kg) 2100 (18.1) 1428.8 (12.6)     Net +2100 +1428.8                     Physical Exam  Constitutional:       General: She is not in acute distress.     Appearance: Normal appearance. She is obese.   HENT:      Head: Normocephalic and atraumatic.   Eyes:      General: No scleral icterus.     Extraocular Movements: Extraocular movements intact.   Cardiovascular:      Rate and Rhythm: Normal rate.   Pulmonary:      Effort: Pulmonary effort is normal. No respiratory distress.   Abdominal:      General: Abdomen is flat. There is no distension.      Palpations: Abdomen is soft.      Comments: Soft, ND, NT   Musculoskeletal:         General: Normal range of motion.   Skin:     General: Skin is warm and dry.      Coloration: Skin is not jaundiced.   Neurological:      General: No focal deficit present.      Mental Status: She is alert and oriented to person, place, and time.   Psychiatric:         Mood and Affect: Mood normal.         Behavior: Behavior normal.         Thought Content: Thought content normal.          Significant Labs:  I have reviewed all pertinent lab results within the past 24 hours.    Significant Diagnostics:  I have reviewed all pertinent imaging results/findings within the past 24 hours.  Assessment/Plan:     Cholelithiasis  53F with abdominal pain and cholelithiasis on imaging.    - No acute surgical intervention indicated. Not likely cholecystitis.   -Pain essentially resolved this am  -No abx from our standpoint  -FLD then as tolerated.   - Rest  per primary  - Please call with any questions        Lucas Alonso MD  General Surgery  [place] - Telemetry    Pt seen and examined.  Agree with note and plan.    Sonny Naylor M.D., F.A.C.S.  [facility] and General Surgery  [facility]

## 2024-11-19 RX ORDER — ATORVASTATIN CALCIUM 20 MG/1
20 TABLET, FILM COATED ORAL DAILY
Qty: 90 TABLET | Refills: 3 | Status: SHIPPED | OUTPATIENT
Start: 2024-11-19

## 2024-11-19 NOTE — TELEPHONE ENCOUNTER
Care Due:                  Date            Visit Type   Department     Provider  --------------------------------------------------------------------------------                                EP -                              PRIMARY      [facility]  Last Visit: 07-      CARE ([facility])   MEDICINE       [facility]  Next Visit: 11-      FOLLOW UP    MEDICINE       [facility]                                                            Last  Test          Frequency    Reason                     Performed    Due Date  --------------------------------------------------------------------------------    Lipid Panel.  12 months..  atorvastatin.............  11-   10-    [facility] Embedded Care Due Messages. Reference number: [id number].   11/19/2024 9:19:15 AM CST

## 2024-11-20 ENCOUNTER — PATIENT OUTREACH (OUTPATIENT)
Dept: ADMINISTRATIVE | Facility: CLINIC | Age: 54
End: 2024-11-20
Payer: COMMERCIAL

## 2024-11-20 NOTE — PROGRESS NOTES
C3 nurse spoke with Amanda Colbert  for a TCC post hospital discharge follow up call. The patient has a scheduled HOSFU appointment with Roge Gayle MD on 11/27/2024 @ 2317.    Message sent to PCP staff.

## 2024-11-20 NOTE — PROGRESS NOTES
C3 nurse attempted to contact Amanda Colbert  for a TCC post hospital discharge follow up call. No answer. Left voicemail with callback information. The patient does not have a scheduled HOSFU appointment.    Please contact patient and schedule follow up appointment using HOSFU visit type on or before 11/25/2024.    Message sent to PCP staff.

## 2024-11-22 LAB
BACTERIA BLD CULT: NORMAL
BACTERIA BLD CULT: NORMAL

## 2024-12-11 RX ORDER — LISINOPRIL 20 MG/1
20 TABLET ORAL
Qty: 90 TABLET | Refills: 0 | Status: SHIPPED | OUTPATIENT
Start: 2024-12-11

## 2024-12-11 NOTE — TELEPHONE ENCOUNTER
No care due was identified.  [facility] Embedded Care Due Messages. Reference number: [id number].   12/11/2024 7:04:17 AM CST

## 2025-01-06 RX ORDER — TIRZEPATIDE 7.5 MG/.5ML
INJECTION, SOLUTION SUBCUTANEOUS
Qty: 12 ML | Refills: 0 | Status: SHIPPED | OUTPATIENT
Start: 2025-01-06

## 2025-01-06 NOTE — TELEPHONE ENCOUNTER
No care due was identified.  [facility] Embedded Care Due Messages. Reference number: [id number].   1/06/2025 7:02:41 AM CST

## 2025-02-07 ENCOUNTER — OCCUPATIONAL HEALTH (OUTPATIENT)
Dept: URGENT CARE | Facility: CLINIC | Age: 55
End: 2025-02-07

## 2025-02-07 DIAGNOSIS — Z13.9 ENCOUNTER FOR SCREENING: Primary | ICD-10-CM

## 2025-02-07 PROCEDURE — 99199 UNLISTED SPECIAL SVC PX/RPRT: CPT | Mod: S$GLB,,, | Performed by: FAMILY MEDICINE

## 2025-02-20 ENCOUNTER — OFFICE VISIT (OUTPATIENT)
Dept: FAMILY MEDICINE | Facility: CLINIC | Age: 55
End: 2025-02-20
Payer: COMMERCIAL

## 2025-02-20 VITALS
HEART RATE: 106 BPM | HEIGHT: 66 IN | WEIGHT: 244.81 LBS | BODY MASS INDEX: 39.34 KG/M2 | DIASTOLIC BLOOD PRESSURE: 66 MMHG | SYSTOLIC BLOOD PRESSURE: 106 MMHG | OXYGEN SATURATION: 97 % | TEMPERATURE: 98 F

## 2025-02-20 DIAGNOSIS — Z23 NEED FOR PNEUMOCOCCAL VACCINATION: ICD-10-CM

## 2025-02-20 DIAGNOSIS — E66.01 MORBID (SEVERE) OBESITY DUE TO EXCESS CALORIES: ICD-10-CM

## 2025-02-20 DIAGNOSIS — Z23 NEED FOR INFLUENZA VACCINATION: ICD-10-CM

## 2025-02-20 DIAGNOSIS — N18.32 STAGE 3B CHRONIC KIDNEY DISEASE: ICD-10-CM

## 2025-02-20 DIAGNOSIS — R73.9 HYPERGLYCEMIA: ICD-10-CM

## 2025-02-20 DIAGNOSIS — E11.9 TYPE 2 DIABETES MELLITUS WITHOUT COMPLICATION, WITHOUT LONG-TERM CURRENT USE OF INSULIN: Primary | ICD-10-CM

## 2025-02-20 DIAGNOSIS — E66.813 CLASS 3 OBESITY: ICD-10-CM

## 2025-02-20 PROCEDURE — 4010F ACE/ARB THERAPY RXD/TAKEN: CPT | Mod: CPTII,S$GLB,, | Performed by: FAMILY MEDICINE

## 2025-02-20 PROCEDURE — G0008 ADMIN INFLUENZA VIRUS VAC: HCPCS | Mod: S$GLB,,, | Performed by: FAMILY MEDICINE

## 2025-02-20 PROCEDURE — 99214 OFFICE O/P EST MOD 30 MIN: CPT | Mod: S$GLB,,, | Performed by: FAMILY MEDICINE

## 2025-02-20 PROCEDURE — 3074F SYST BP LT 130 MM HG: CPT | Mod: CPTII,S$GLB,, | Performed by: FAMILY MEDICINE

## 2025-02-20 PROCEDURE — 1160F RVW MEDS BY RX/DR IN RCRD: CPT | Mod: CPTII,S$GLB,, | Performed by: FAMILY MEDICINE

## 2025-02-20 PROCEDURE — G2211 COMPLEX E/M VISIT ADD ON: HCPCS | Mod: S$GLB,,, | Performed by: FAMILY MEDICINE

## 2025-02-20 PROCEDURE — 90656 IIV3 VACC NO PRSV 0.5 ML IM: CPT | Mod: S$GLB,,, | Performed by: FAMILY MEDICINE

## 2025-02-20 PROCEDURE — G0009 ADMIN PNEUMOCOCCAL VACCINE: HCPCS | Mod: S$GLB,,, | Performed by: FAMILY MEDICINE

## 2025-02-20 PROCEDURE — 3044F HG A1C LEVEL LT 7.0%: CPT | Mod: CPTII,S$GLB,, | Performed by: FAMILY MEDICINE

## 2025-02-20 PROCEDURE — 3078F DIAST BP <80 MM HG: CPT | Mod: CPTII,S$GLB,, | Performed by: FAMILY MEDICINE

## 2025-02-20 PROCEDURE — 1159F MED LIST DOCD IN RCRD: CPT | Mod: CPTII,S$GLB,, | Performed by: FAMILY MEDICINE

## 2025-02-20 PROCEDURE — 3008F BODY MASS INDEX DOCD: CPT | Mod: CPTII,S$GLB,, | Performed by: FAMILY MEDICINE

## 2025-02-20 PROCEDURE — 3061F NEG MICROALBUMINURIA REV: CPT | Mod: CPTII,S$GLB,, | Performed by: FAMILY MEDICINE

## 2025-02-20 PROCEDURE — 90677 PCV20 VACCINE IM: CPT | Mod: S$GLB,,, | Performed by: FAMILY MEDICINE

## 2025-02-20 PROCEDURE — 3066F NEPHROPATHY DOC TX: CPT | Mod: CPTII,S$GLB,, | Performed by: FAMILY MEDICINE

## 2025-02-20 NOTE — LETTER
February 20, 2025      [address] [address]  Phone: [phone]  Fax: [phone]       Patient: Amanda Colbert   YOB: 1970  Date of Visit: 02/20/2025    To Whom It May Concern:    Sameer Colbert  was at [facility] on 02/20/2025. The patient may return to work/school on 2/21/25 with no restrictions. If you have any questions or concerns, or if I can be of further assistance, please do not hesitate to contact me.    Sincerely,    Roge Sanchez MD

## 2025-02-21 ENCOUNTER — PATIENT MESSAGE (OUTPATIENT)
Dept: OPHTHALMOLOGY | Facility: CLINIC | Age: 55
End: 2025-02-21
Payer: COMMERCIAL

## 2025-02-25 ENCOUNTER — LAB VISIT (OUTPATIENT)
Dept: LAB | Facility: HOSPITAL | Age: 55
End: 2025-02-25
Attending: FAMILY MEDICINE
Payer: COMMERCIAL

## 2025-02-25 DIAGNOSIS — E11.9 TYPE 2 DIABETES MELLITUS WITHOUT COMPLICATION, WITHOUT LONG-TERM CURRENT USE OF INSULIN: ICD-10-CM

## 2025-02-25 LAB
ALBUMIN SERPL BCP-MCNC: 4.2 G/DL (ref 3.5–5.2)
ALP SERPL-CCNC: 83 U/L (ref 38–126)
ALT SERPL W/O P-5'-P-CCNC: 73 U/L (ref 10–44)
ANION GAP SERPL CALC-SCNC: 10 MMOL/L (ref 8–16)
AST SERPL-CCNC: 66 U/L (ref 15–46)
BASOPHILS # BLD AUTO: 0.05 K/UL (ref 0–0.2)
BASOPHILS NFR BLD: 0.6 % (ref 0–1.9)
BILIRUB SERPL-MCNC: 0.7 MG/DL (ref 0.1–1)
CALCIUM SERPL-MCNC: 10 MG/DL (ref 8.7–10.5)
CHLORIDE SERPL-SCNC: 97 MMOL/L (ref 95–110)
CHOLEST SERPL-MCNC: 182 MG/DL (ref 120–199)
CHOLEST/HDLC SERPL: 3.9 {RATIO} (ref 2–5)
CO2 SERPL-SCNC: 32 MMOL/L (ref 23–29)
CREAT SERPL-MCNC: 1.77 MG/DL (ref 0.5–1.4)
DIFFERENTIAL METHOD BLD: ABNORMAL
EOSINOPHIL # BLD AUTO: 0.4 K/UL (ref 0–0.5)
EOSINOPHIL NFR BLD: 4.8 % (ref 0–8)
ERYTHROCYTE [DISTWIDTH] IN BLOOD BY AUTOMATED COUNT: 12.7 % (ref 11.5–14.5)
EST. GFR  (NO RACE VARIABLE): 33.3 ML/MIN/1.73 M^2
ESTIMATED AVG GLUCOSE: 128 MG/DL (ref 68–131)
GLUCOSE SERPL-MCNC: 104 MG/DL (ref 70–110)
HBA1C MFR BLD: 6.1 % (ref 4–5.6)
HCT VFR BLD AUTO: 36.8 % (ref 37–48.5)
HDLC SERPL-MCNC: 47 MG/DL (ref 40–75)
HDLC SERPL: 25.8 % (ref 20–50)
HGB BLD-MCNC: 12 G/DL (ref 12–16)
IMM GRANULOCYTES # BLD AUTO: 0.03 K/UL (ref 0–0.04)
IMM GRANULOCYTES NFR BLD AUTO: 0.4 % (ref 0–0.5)
LDLC SERPL CALC-MCNC: 108 MG/DL (ref 63–159)
LYMPHOCYTES # BLD AUTO: 2.6 K/UL (ref 1–4.8)
LYMPHOCYTES NFR BLD: 32.4 % (ref 18–48)
MCH RBC QN AUTO: 32.2 PG (ref 27–31)
MCHC RBC AUTO-ENTMCNC: 32.6 G/DL (ref 32–36)
MCV RBC AUTO: 99 FL (ref 82–98)
MONOCYTES # BLD AUTO: 0.7 K/UL (ref 0.3–1)
MONOCYTES NFR BLD: 8.6 % (ref 4–15)
NEUTROPHILS # BLD AUTO: 4.3 K/UL (ref 1.8–7.7)
NEUTROPHILS NFR BLD: 53.2 % (ref 38–73)
NONHDLC SERPL-MCNC: 135 MG/DL
NRBC BLD-RTO: 0 /100 WBC
PLATELET # BLD AUTO: 313 K/UL (ref 150–450)
PMV BLD AUTO: 9.6 FL (ref 9.2–12.9)
POTASSIUM SERPL-SCNC: 4.7 MMOL/L (ref 3.5–5.1)
PROT SERPL-MCNC: 8.2 G/DL (ref 6–8.4)
RBC # BLD AUTO: 3.73 M/UL (ref 4–5.4)
SODIUM SERPL-SCNC: 139 MMOL/L (ref 136–145)
TRIGL SERPL-MCNC: 135 MG/DL (ref 30–150)
TSH SERPL DL<=0.005 MIU/L-ACNC: 1.7 UIU/ML (ref 0.4–4)
UUN UR-MCNC: 28 MG/DL (ref 7–17)
WBC # BLD AUTO: 8.15 K/UL (ref 3.9–12.7)

## 2025-02-25 PROCEDURE — 83036 HEMOGLOBIN GLYCOSYLATED A1C: CPT | Performed by: FAMILY MEDICINE

## 2025-02-25 PROCEDURE — 80061 LIPID PANEL: CPT | Performed by: FAMILY MEDICINE

## 2025-02-25 PROCEDURE — 80053 COMPREHEN METABOLIC PANEL: CPT | Mod: PN | Performed by: FAMILY MEDICINE

## 2025-02-25 PROCEDURE — 36415 COLL VENOUS BLD VENIPUNCTURE: CPT | Mod: PN | Performed by: FAMILY MEDICINE

## 2025-02-25 PROCEDURE — 84443 ASSAY THYROID STIM HORMONE: CPT | Mod: PN | Performed by: FAMILY MEDICINE

## 2025-02-25 PROCEDURE — 85025 COMPLETE CBC W/AUTO DIFF WBC: CPT | Mod: PN | Performed by: FAMILY MEDICINE

## 2025-03-05 ENCOUNTER — RESULTS FOLLOW-UP (OUTPATIENT)
Dept: FAMILY MEDICINE | Facility: CLINIC | Age: 55
End: 2025-03-05

## 2025-03-05 DIAGNOSIS — R74.8 ELEVATED LIVER ENZYMES: Primary | ICD-10-CM

## 2025-03-05 DIAGNOSIS — E11.9 TYPE 2 DIABETES MELLITUS WITHOUT COMPLICATION, WITHOUT LONG-TERM CURRENT USE OF INSULIN: ICD-10-CM

## 2025-03-05 NOTE — TELEPHONE ENCOUNTER
Your lab work showed some abnormalities use in your creatinine which indicates a decrease in kidney function.  I would like to repeat the test to see if this is transient or if it persist.      Also there is an increase in your AST and ALT.  These are enzymes that come from the liver.  I will repeat them as well.    Your diabetes shows excellent control  Your lipid/cholesterol is a little elevated but stable this time.    I suggest drinking plenty of water.      Staff please schedule in one to two weeks.

## 2025-03-08 PROBLEM — N18.32 STAGE 3B CHRONIC KIDNEY DISEASE: Status: ACTIVE | Noted: 2025-03-08

## 2025-03-08 PROBLEM — E66.01 MORBID (SEVERE) OBESITY DUE TO EXCESS CALORIES: Status: ACTIVE | Noted: 2025-03-08

## 2025-03-08 NOTE — PROGRESS NOTES
" Patient ID: Amanda Colbert is a 54 y.o. female.    Chief Complaint: Follow-up    HPI    Amanda Colbert is a 54 y.o. female here for routine follow-up.  Not having any significant symptoms or problems this time.  Follow-up for type 2 diabetes, lipidemia associated with diabetes, hypertension associated with diabetes, obesity.  Stage IIIB chronic kidney disease associated with diabetes.  Also wellness-health maintenance-need for vaccination and pneumococcal vaccination            Vitals:    02/20/25 0900   BP: 106/66   BP Location: Right arm   Patient Position: Sitting   Pulse: 106   Temp: 98.2 °F (36.8 °C)   TempSrc: Oral   SpO2: 97%   Weight: 111 kg (244 lb 13.1 oz)   Height: 5' 6" (1.676 m)                Physical Exam    Constitutional:  Oriented to person, place, and time.appears well-developed and well-nourished.  No distress.      HENT  Head: Normocephalic and atraumatic  Right Ear: External ear normal.   Left Ear: External ear normal.   Nose: External nose normal.   Mouth:  Moist mucus membranes.    Eyes:  Conjunctivae are normal. Right eye exhibits no discharge.  Left eye exhibits no discharge. No scleral icterus.  No periorbital edema    Cardiovascular:  Regular rate and rhythm with normal S1 and S2     Pulmonary/Chest:   Clear to auscultation bilaterally without wheezes, rhonchi or rales      Musculoskeletal:  No edema. No obvious deformity No wasting       Neurological:  Alert and oriented to person, place, and time.   Coordination normal.     Skin:   Skin is warm and dry.  No diaphoresis.   No rash noted.     Psychiatric: Normal mood and affect. Behavior is normal.  Judgment and thought content normal.     Physical Exam              Complete Blood Count  Lab Results   Component Value Date    RBC 3.73 (L) 02/25/2025    HGB 12.0 02/25/2025    HCT 36.8 (L) 02/25/2025    MCV 99 (H) 02/25/2025    MCH 32.2 (H) 02/25/2025    MCHC 32.6 02/25/2025    RDW 12.7 02/25/2025     02/25/2025    MPV 9.6 " 02/25/2025    GRAN 4.3 02/25/2025    GRAN 53.2 02/25/2025    LYMPH 2.6 02/25/2025    LYMPH 32.4 02/25/2025    MONO 0.7 02/25/2025    MONO 8.6 02/25/2025    EOS 0.4 02/25/2025    BASO 0.05 02/25/2025    EOSINOPHIL 4.8 02/25/2025    BASOPHIL 0.6 02/25/2025    DIFFMETHOD Automated 02/25/2025       Comprehensive Metabolic Panel  Lab Results   Component Value Date     02/25/2025    BUN 28 (H) 02/25/2025    CREATININE 1.77 (H) 02/25/2025     02/25/2025    K 4.7 02/25/2025    CL 97 02/25/2025    PROT 8.2 02/25/2025    ALBUMIN 4.2 02/25/2025    BILITOT 0.7 02/25/2025    AST 66 (H) 02/25/2025    ALKPHOS 83 02/25/2025    CO2 32 (H) 02/25/2025    ALT 73 (H) 02/25/2025    ANIONGAP 10 02/25/2025       TSH  Lab Results   Component Value Date    TSH 1.700 02/25/2025       Assessment / Plan:      ICD-10-CM ICD-9-CM   1. Type 2 diabetes mellitus without complication, without long-term current use of insulin  E11.9 250.00   2. Need for pneumococcal vaccination  Z23 V03.82   3. Need for influenza vaccination  Z23 V04.81   4. Class 3 obesity  E66.813 278.01   5. Hyperglycemia  R73.9 790.29   6. Morbid (severe) obesity due to excess calories  E66.01 278.01   7. Stage 3b chronic kidney disease  N18.32 585.3     Type 2 diabetes mellitus without complication, without long-term current use of insulin  -     Ambulatory referral/consult to Ophthalmology; Future; Expected date: 02/27/2025  -     Lipid Panel; Future; Expected date: 02/20/2025  -     TSH; Future; Expected date: 02/20/2025  -     Comprehensive Metabolic Panel; Future; Expected date: 02/20/2025  -     Microalbumin/Creatinine Ratio, Urine; Future; Expected date: 02/20/2025  -     Hemoglobin A1C; Future; Expected date: 02/20/2025  -     CBC Auto Differential; Future; Expected date: 02/20/2025  -     Hemoglobin A1C; Future; Expected date: 02/20/2025    Need for pneumococcal vaccination  -     pneumoc 20-roberto conj-dip cr(PF) (PREVNAR-20 (PF)) injection Syrg 0.5 mL    Need  for influenza vaccination  -     influenza (Flulaval, Fluzone, Fluarix) 45 mcg/0.5 mL IM vaccine (> or = 6 mo) 0.5 mL    Class 3 obesity    Hyperglycemia    Morbid (severe) obesity due to excess calories    Stage 3b chronic kidney disease        Assessment & Plan          Discussed diabetes mellitus medication associated with this  Discussed hypertension and using lisinopril two benefit lower blood pressure and reduce long-term consequences of diabetes   Mounjaro-GLP one-for diabetes working vial-continue Glucophage  Hydrochlorothiazide for fluid benefits and blood pressure   Lipitor 20 milligrams for cardiovascular protection with diabetes  Elavil for insomnia

## 2025-03-13 RX ORDER — LISINOPRIL 20 MG/1
20 TABLET ORAL
Qty: 90 TABLET | Refills: 3 | Status: SHIPPED | OUTPATIENT
Start: 2025-03-13

## 2025-03-13 NOTE — TELEPHONE ENCOUNTER
Refill Decision Note   Amanda Colbert  is requesting a refill authorization.  Brief Assessment and Rationale for Refill:  Approve     Medication Therapy Plan: Renal function reviewed, no change to therapy      Comments:     Note composed:11:54 AM 03/13/2025

## 2025-03-13 NOTE — TELEPHONE ENCOUNTER
No care due was identified.  [facility] Embedded Care Due Messages. Reference number: [id number].   3/13/2025 7:04:02 AM CDT

## 2025-03-22 NOTE — TELEPHONE ENCOUNTER
No care due was identified.  [facility] Embedded Care Due Messages. Reference number: [id number].   3/22/2025 9:29:25 AM CDT

## 2025-03-23 RX ORDER — TIRZEPATIDE 7.5 MG/.5ML
7.5 INJECTION, SOLUTION SUBCUTANEOUS WEEKLY
Qty: 12 ML | Refills: 1 | Status: SHIPPED | OUTPATIENT
Start: 2025-03-23

## 2025-03-24 NOTE — TELEPHONE ENCOUNTER
Refill Decision Note   Amanda Colbert  is requesting a refill authorization.  Brief Assessment and Rationale for Refill:  Approve     Medication Therapy Plan:         Comments:     Note composed:11:41 PM 03/23/2025

## 2025-06-09 ENCOUNTER — HOSPITAL ENCOUNTER (OUTPATIENT)
Dept: RADIOLOGY | Facility: HOSPITAL | Age: 55
Discharge: HOME | End: 2025-06-09
Attending: FAMILY MEDICINE
Payer: COMMERCIAL

## 2025-06-09 DIAGNOSIS — Z12.31 ENCOUNTER FOR SCREENING MAMMOGRAM FOR BREAST CANCER: ICD-10-CM

## 2025-06-09 PROCEDURE — 77063 BREAST TOMOSYNTHESIS BI: CPT | Mod: 26,,, | Performed by: RADIOLOGY

## 2025-06-09 PROCEDURE — 77067 SCR MAMMO BI INCL CAD: CPT | Mod: TC,PN

## 2025-06-09 PROCEDURE — 77067 SCR MAMMO BI INCL CAD: CPT | Mod: 26,,, | Performed by: RADIOLOGY

## 2025-06-10 ENCOUNTER — RESULTS FOLLOW-UP (OUTPATIENT)
Dept: FAMILY MEDICINE | Facility: CLINIC | Age: 55
End: 2025-06-10

## 2025-08-22 ENCOUNTER — LAB VISIT (OUTPATIENT)
Dept: LAB | Facility: HOSPITAL | Age: 55
End: 2025-08-22
Attending: FAMILY MEDICINE
Payer: COMMERCIAL

## 2025-08-22 DIAGNOSIS — E11.9 TYPE 2 DIABETES MELLITUS WITHOUT COMPLICATION, WITHOUT LONG-TERM CURRENT USE OF INSULIN: ICD-10-CM

## 2025-08-22 LAB
EAG (OHS): 123 MG/DL (ref 68–131)
HBA1C MFR BLD: 5.9 % (ref 4–5.6)

## 2025-08-22 PROCEDURE — 83036 HEMOGLOBIN GLYCOSYLATED A1C: CPT | Mod: PN

## 2025-08-22 PROCEDURE — 36415 COLL VENOUS BLD VENIPUNCTURE: CPT | Mod: PN

## 2025-08-23 RX ORDER — METFORMIN HYDROCHLORIDE 500 MG/1
500 TABLET ORAL 2 TIMES DAILY WITH MEALS
Qty: 180 TABLET | Refills: 0 | Status: SHIPPED | OUTPATIENT
Start: 2025-08-23

## 2025-09-05 RX ORDER — TIRZEPATIDE 7.5 MG/.5ML
7.5 INJECTION, SOLUTION SUBCUTANEOUS WEEKLY
Qty: 6 ML | Refills: 0 | Status: SHIPPED | OUTPATIENT
Start: 2025-09-05